# Patient Record
Sex: FEMALE | Race: WHITE | NOT HISPANIC OR LATINO | Employment: OTHER | URBAN - METROPOLITAN AREA
[De-identification: names, ages, dates, MRNs, and addresses within clinical notes are randomized per-mention and may not be internally consistent; named-entity substitution may affect disease eponyms.]

---

## 2017-09-21 ENCOUNTER — APPOINTMENT (EMERGENCY)
Dept: RADIOLOGY | Facility: HOSPITAL | Age: 82
DRG: 149 | End: 2017-09-21
Payer: COMMERCIAL

## 2017-09-21 ENCOUNTER — OFFICE VISIT (OUTPATIENT)
Dept: URGENT CARE | Age: 82
End: 2017-09-21
Payer: COMMERCIAL

## 2017-09-21 ENCOUNTER — HOSPITAL ENCOUNTER (INPATIENT)
Facility: HOSPITAL | Age: 82
LOS: 1 days | Discharge: HOME | DRG: 149 | End: 2017-09-24
Attending: EMERGENCY MEDICINE | Admitting: INTERNAL MEDICINE
Payer: COMMERCIAL

## 2017-09-21 DIAGNOSIS — I16.0 HYPERTENSIVE URGENCY: Primary | ICD-10-CM

## 2017-09-21 DIAGNOSIS — R42 VERTIGO: ICD-10-CM

## 2017-09-21 PROBLEM — E78.5 HYPERLIPIDEMIA: Status: ACTIVE | Noted: 2017-09-21

## 2017-09-21 PROBLEM — I10 HYPERTENSION: Status: ACTIVE | Noted: 2017-09-21

## 2017-09-21 PROBLEM — J34.89 SINUS PRESSURE: Status: ACTIVE | Noted: 2017-09-21

## 2017-09-21 PROBLEM — R73.03 PREDIABETES: Status: ACTIVE | Noted: 2017-09-21

## 2017-09-21 LAB
ANION GAP SERPL CALCULATED.3IONS-SCNC: 8 MMOL/L (ref 4–13)
ATRIAL RATE: 61 BPM
BASOPHILS # BLD AUTO: 0.03 THOUSANDS/ΜL (ref 0–0.1)
BASOPHILS NFR BLD AUTO: 0 % (ref 0–1)
BUN SERPL-MCNC: 27 MG/DL (ref 5–25)
CALCIUM SERPL-MCNC: 9.9 MG/DL (ref 8.3–10.1)
CHLORIDE SERPL-SCNC: 105 MMOL/L (ref 100–108)
CO2 SERPL-SCNC: 23 MMOL/L (ref 21–32)
CREAT SERPL-MCNC: 1.24 MG/DL (ref 0.6–1.3)
EOSINOPHIL # BLD AUTO: 0.14 THOUSAND/ΜL (ref 0–0.61)
EOSINOPHIL NFR BLD AUTO: 2 % (ref 0–6)
ERYTHROCYTE [DISTWIDTH] IN BLOOD BY AUTOMATED COUNT: 12.7 % (ref 11.6–15.1)
GFR SERPL CREATININE-BSD FRML MDRD: 38 ML/MIN/1.73SQ M
GLUCOSE SERPL-MCNC: 97 MG/DL (ref 65–140)
HCT VFR BLD AUTO: 41.5 % (ref 34.8–46.1)
HGB BLD-MCNC: 14.4 G/DL (ref 11.5–15.4)
LYMPHOCYTES # BLD AUTO: 2.43 THOUSANDS/ΜL (ref 0.6–4.47)
LYMPHOCYTES NFR BLD AUTO: 29 % (ref 14–44)
MCH RBC QN AUTO: 30.7 PG (ref 26.8–34.3)
MCHC RBC AUTO-ENTMCNC: 34.7 G/DL (ref 31.4–37.4)
MCV RBC AUTO: 89 FL (ref 82–98)
MONOCYTES # BLD AUTO: 0.52 THOUSAND/ΜL (ref 0.17–1.22)
MONOCYTES NFR BLD AUTO: 6 % (ref 4–12)
NEUTROPHILS # BLD AUTO: 5.29 THOUSANDS/ΜL (ref 1.85–7.62)
NEUTS SEG NFR BLD AUTO: 63 % (ref 43–75)
NRBC BLD AUTO-RTO: 0 /100 WBCS
P AXIS: 85 DEGREES
PLATELET # BLD AUTO: 267 THOUSANDS/UL (ref 149–390)
PMV BLD AUTO: 10.3 FL (ref 8.9–12.7)
POTASSIUM SERPL-SCNC: 4.3 MMOL/L (ref 3.5–5.3)
PR INTERVAL: 188 MS
QRS AXIS: 0 DEGREES
QRSD INTERVAL: 84 MS
QT INTERVAL: 410 MS
QTC INTERVAL: 412 MS
RBC # BLD AUTO: 4.69 MILLION/UL (ref 3.81–5.12)
SODIUM SERPL-SCNC: 136 MMOL/L (ref 136–145)
T WAVE AXIS: 90 DEGREES
VENTRICULAR RATE: 61 BPM
WBC # BLD AUTO: 8.43 THOUSAND/UL (ref 4.31–10.16)

## 2017-09-21 PROCEDURE — 99285 EMERGENCY DEPT VISIT HI MDM: CPT

## 2017-09-21 PROCEDURE — 93005 ELECTROCARDIOGRAM TRACING: CPT | Performed by: EMERGENCY MEDICINE

## 2017-09-21 PROCEDURE — 99213 OFFICE O/P EST LOW 20 MIN: CPT | Performed by: FAMILY MEDICINE

## 2017-09-21 PROCEDURE — 80048 BASIC METABOLIC PNL TOTAL CA: CPT | Performed by: EMERGENCY MEDICINE

## 2017-09-21 PROCEDURE — S9088 SERVICES PROVIDED IN URGENT: HCPCS | Performed by: FAMILY MEDICINE

## 2017-09-21 PROCEDURE — 70450 CT HEAD/BRAIN W/O DYE: CPT

## 2017-09-21 PROCEDURE — 36415 COLL VENOUS BLD VENIPUNCTURE: CPT | Performed by: EMERGENCY MEDICINE

## 2017-09-21 PROCEDURE — 85025 COMPLETE CBC W/AUTO DIFF WBC: CPT | Performed by: EMERGENCY MEDICINE

## 2017-09-21 RX ORDER — PANTOPRAZOLE SODIUM 20 MG/1
20 TABLET, DELAYED RELEASE ORAL
Status: DISCONTINUED | OUTPATIENT
Start: 2017-09-22 | End: 2017-09-24 | Stop reason: HOSPADM

## 2017-09-21 RX ORDER — LOSARTAN POTASSIUM 50 MG/1
25 TABLET ORAL DAILY
COMMUNITY
End: 2017-09-24 | Stop reason: HOSPADM

## 2017-09-21 RX ORDER — OMEPRAZOLE 20 MG/1
20 CAPSULE, DELAYED RELEASE ORAL DAILY
COMMUNITY
End: 2020-07-30 | Stop reason: ALTCHOICE

## 2017-09-21 RX ORDER — MECLIZINE HYDROCHLORIDE 25 MG/1
25 TABLET ORAL ONCE
Status: COMPLETED | OUTPATIENT
Start: 2017-09-21 | End: 2017-09-21

## 2017-09-21 RX ORDER — LOSARTAN POTASSIUM 50 MG/1
50 TABLET ORAL DAILY
Status: DISCONTINUED | OUTPATIENT
Start: 2017-09-22 | End: 2017-09-23

## 2017-09-21 RX ORDER — MECLIZINE HCL 12.5 MG/1
12.5 TABLET ORAL DAILY
Status: DISCONTINUED | OUTPATIENT
Start: 2017-09-22 | End: 2017-09-22

## 2017-09-21 RX ORDER — SODIUM CHLORIDE 9 MG/ML
75 INJECTION, SOLUTION INTRAVENOUS CONTINUOUS
Status: DISCONTINUED | OUTPATIENT
Start: 2017-09-21 | End: 2017-09-23

## 2017-09-21 RX ORDER — ASPIRIN 81 MG/1
81 TABLET, CHEWABLE ORAL DAILY
Status: DISCONTINUED | OUTPATIENT
Start: 2017-09-22 | End: 2017-09-24 | Stop reason: HOSPADM

## 2017-09-21 RX ORDER — CHLORAL HYDRATE 500 MG
1000 CAPSULE ORAL 2 TIMES DAILY
Status: DISCONTINUED | OUTPATIENT
Start: 2017-09-21 | End: 2017-09-24 | Stop reason: HOSPADM

## 2017-09-21 RX ORDER — CHLORAL HYDRATE 500 MG
1000 CAPSULE ORAL 2 TIMES DAILY
COMMUNITY
End: 2020-07-30 | Stop reason: ALTCHOICE

## 2017-09-21 RX ORDER — ATENOLOL 25 MG/1
25 TABLET ORAL DAILY
COMMUNITY
End: 2017-09-24 | Stop reason: HOSPADM

## 2017-09-21 RX ORDER — HEPARIN SODIUM 5000 [USP'U]/ML
5000 INJECTION, SOLUTION INTRAVENOUS; SUBCUTANEOUS EVERY 8 HOURS SCHEDULED
Status: DISCONTINUED | OUTPATIENT
Start: 2017-09-21 | End: 2017-09-24 | Stop reason: HOSPADM

## 2017-09-21 RX ORDER — HYDRALAZINE HYDROCHLORIDE 20 MG/ML
5 INJECTION INTRAMUSCULAR; INTRAVENOUS ONCE
Status: COMPLETED | OUTPATIENT
Start: 2017-09-21 | End: 2017-09-21

## 2017-09-21 RX ORDER — PHENOL 1.4 %
600 AEROSOL, SPRAY (ML) MUCOUS MEMBRANE DAILY
COMMUNITY

## 2017-09-21 RX ORDER — HYDRALAZINE HYDROCHLORIDE 20 MG/ML
5 INJECTION INTRAMUSCULAR; INTRAVENOUS EVERY 6 HOURS PRN
Status: DISCONTINUED | OUTPATIENT
Start: 2017-09-21 | End: 2017-09-22

## 2017-09-21 RX ORDER — CALCIUM CARBONATE 200(500)MG
1250 TABLET,CHEWABLE ORAL DAILY
Status: DISCONTINUED | OUTPATIENT
Start: 2017-09-22 | End: 2017-09-24 | Stop reason: HOSPADM

## 2017-09-21 RX ORDER — MELATONIN
1000 DAILY
COMMUNITY

## 2017-09-21 RX ORDER — ACETAMINOPHEN 325 MG/1
650 TABLET ORAL EVERY 6 HOURS PRN
Status: DISCONTINUED | OUTPATIENT
Start: 2017-09-21 | End: 2017-09-24 | Stop reason: HOSPADM

## 2017-09-21 RX ORDER — MELATONIN
1000 DAILY
Status: DISCONTINUED | OUTPATIENT
Start: 2017-09-22 | End: 2017-09-24 | Stop reason: HOSPADM

## 2017-09-21 RX ORDER — ATENOLOL 25 MG/1
25 TABLET ORAL DAILY
Status: DISCONTINUED | OUTPATIENT
Start: 2017-09-22 | End: 2017-09-22

## 2017-09-21 RX ORDER — ASPIRIN 81 MG/1
81 TABLET, CHEWABLE ORAL DAILY
COMMUNITY

## 2017-09-21 RX ORDER — LOSARTAN POTASSIUM 50 MG/1
25 TABLET ORAL DAILY
Status: DISCONTINUED | OUTPATIENT
Start: 2017-09-22 | End: 2017-09-21

## 2017-09-21 RX ADMIN — Medication 1000 MG: at 19:46

## 2017-09-21 RX ADMIN — HYDRALAZINE HYDROCHLORIDE 5 MG: 20 INJECTION INTRAMUSCULAR; INTRAVENOUS at 19:46

## 2017-09-21 RX ADMIN — SODIUM CHLORIDE 75 ML/HR: 0.9 INJECTION, SOLUTION INTRAVENOUS at 19:49

## 2017-09-21 RX ADMIN — HEPARIN SODIUM 5000 UNITS: 5000 INJECTION, SOLUTION INTRAVENOUS; SUBCUTANEOUS at 21:40

## 2017-09-21 RX ADMIN — MECLIZINE HYDROCHLORIDE 25 MG: 25 TABLET ORAL at 17:45

## 2017-09-21 NOTE — ED ATTENDING ATTESTATION
Marjorie Kayser, MD, saw and evaluated the patient  I have discussed the patient with the resident/non-physician practitioner and agree with the resident's/non-physician practitioner's findings, Plan of Care, and MDM as documented in the resident's/non-physician practitioner's note, except where noted  All available labs and Radiology studies were reviewed  At this point I agree with the current assessment done in the Emergency Department  I have conducted an independent evaluation of this patient a history and physical is as follows:      Critical Care Time  CritCare Time    81 yo female with hx of htn, hld, sent in from urgent care for high bp  Pt went to urgent care for vertigo, weakness, fatigue, lightheadedness  Pt with hx of vertigo intermittently for one year  Pt had meclizine but not taking it currently  Pt vertigo worse with position  No fever, no chills, no sob, no cp  No headache  Vss, hypertensive, afebrile, lungs cta, rrr, abdomen soft nontender, no neuro deficits  Ekg, labs, cta head and neck   Hypertensive urgency, labs

## 2017-09-21 NOTE — ED PROVIDER NOTES
History  Chief Complaint   Patient presents with    Hypertension     Pt was at urgent care because of "not feeling right", jaw throat pain  Pt sent here for hypertension  44-year-old with history of hypertension hyperlipidemia presents for evaluation of vertigo and elevated blood pressure  Patient reports that she was not feeling well today and went to urgent care and was advised to come to the ER given her blood pressure being elevated in the 570O systolic  Reports that she has had intermittent vertigo off and on for the past year  Reports having a prior normal CT of her head  Has previously taken meclizine but stopped taking it because of the side effects  Denies having any nausea, vomiting, headache, chest pain or shortness of breath  No recent illness  Denies any recent changes to her medications  She has not missed any doses of her antihypertensives  Prior to Admission Medications   Prescriptions Last Dose Informant Patient Reported? Taking? Multiple Vitamins-Minerals (MULTIVITAMIN ADULT PO)   Yes Yes   Sig: Take 1 tablet by mouth daily   Omega-3 Fatty Acids (FISH OIL) 1,000 mg   Yes Yes   Sig: Take 1,000 mg by mouth 2 (two) times a day   aspirin 81 mg chewable tablet   Yes Yes   Sig: Chew 81 mg daily   atenolol (TENORMIN) 25 mg tablet   Yes Yes   Sig: Take 25 mg by mouth daily   calcium carbonate (OS-ESTEBAN) 600 MG tablet   Yes Yes   Sig: Take 600 mg by mouth daily   cholecalciferol (VITAMIN D3) 1,000 units tablet   Yes Yes   Sig: Take 1,000 Units by mouth daily   losartan (COZAAR) 50 mg tablet   Yes Yes   Sig: Take 25 mg by mouth daily Patient takes 1 and 1/2 pills daily     omeprazole (PriLOSEC) 20 mg delayed release capsule   Yes Yes   Sig: Take 20 mg by mouth daily      Facility-Administered Medications: None       Past Medical History:   Diagnosis Date    Hyperlipidemia     Hypertension        Past Surgical History:   Procedure Laterality Date    HYSTERECTOMY         History reviewed  No pertinent family history  I have reviewed and agree with the history as documented  Social History   Substance Use Topics    Smoking status: Former Smoker    Smokeless tobacco: Never Used    Alcohol use No        Review of Systems   Constitutional: Negative for chills and fever  HENT: Negative for congestion and sore throat  Eyes: Negative for pain and redness  Respiratory: Negative for shortness of breath and wheezing  Cardiovascular: Negative for chest pain and palpitations  Gastrointestinal: Negative for abdominal pain, diarrhea and vomiting  Endocrine: Negative for polydipsia and polyphagia  Genitourinary: Negative for dysuria and flank pain  Musculoskeletal: Negative for arthralgias and back pain  Skin: Negative for rash and wound  Neurological: Positive for dizziness  Negative for seizures and headaches  Psychiatric/Behavioral: Negative for agitation and behavioral problems  All other systems reviewed and are negative  Physical Exam  ED Triage Vitals [09/21/17 1453]   Temperature Pulse Respirations Blood Pressure SpO2   (!) 97 4 °F (36 3 °C) 65 16 (!) 231/122 97 %      Temp Source Heart Rate Source Patient Position - Orthostatic VS BP Location FiO2 (%)   Oral Monitor Sitting Left arm --      Pain Score       No Pain           Physical Exam   Constitutional: She is oriented to person, place, and time  She appears well-developed and well-nourished  HENT:   Head: Normocephalic and atraumatic  Right Ear: External ear normal    Left Ear: External ear normal    Mouth/Throat: Oropharynx is clear and moist    Eyes: EOM are normal  Pupils are equal, round, and reactive to light  Neck: Normal range of motion  Cardiovascular: Normal rate, regular rhythm and normal heart sounds  Exam reveals no friction rub  No murmur heard  Pulmonary/Chest: Effort normal  No respiratory distress  She has no wheezes  Abdominal: Soft   Bowel sounds are normal  She exhibits no distension  There is no tenderness  There is no rebound and no guarding  Musculoskeletal: Normal range of motion  She exhibits no edema  Neurological: She is alert and oriented to person, place, and time  No cranial nerve deficit  Coordination normal    Skin: Skin is warm  No erythema  Psychiatric: She has a normal mood and affect  Her behavior is normal    Nursing note and vitals reviewed  ED Medications  Medications   meclizine (ANTIVERT) tablet 25 mg (25 mg Oral Given 9/21/17 4005)       Diagnostic Studies  Labs Reviewed   BASIC METABOLIC PANEL - Abnormal        Result Value Ref Range Status    BUN 27 (*) 5 - 25 mg/dL Final    Sodium 136  136 - 145 mmol/L Final    Potassium 4 3  3 5 - 5 3 mmol/L Final    Chloride 105  100 - 108 mmol/L Final    CO2 23  21 - 32 mmol/L Final    Anion Gap 8  4 - 13 mmol/L Final    Creatinine 1 24  0 60 - 1 30 mg/dL Final    Comment: Standardized to IDMS reference method    Glucose 97  65 - 140 mg/dL Final    Comment:   If the patient is fasting, the ADA then defines impaired fasting glucose as > 100 mg/dL and diabetes as > or equal to 123 mg/dL  Specimen collection should occur prior to Sulfasalazine administration due to the potential for falsely depressed results  Specimen collection should occur prior to Sulfapyridine administration due to the potential for falsely elevated results  Calcium 9 9  8 3 - 10 1 mg/dL Final    eGFR 38  ml/min/1 73sq m Final    Narrative:     National Kidney Disease Education Program recommendations are as follows:  GFR calculation is accurate only with a steady state creatinine  Chronic Kidney disease less than 60 ml/min/1 73 sq  meters  Kidney failure less than 15 ml/min/1 73 sq  meters     CBC AND DIFFERENTIAL - Normal    WBC 8 43  4 31 - 10 16 Thousand/uL Final    RBC 4 69  3 81 - 5 12 Million/uL Final    Hemoglobin 14 4  11 5 - 15 4 g/dL Final    Hematocrit 41 5  34 8 - 46 1 % Final    MCV 89  82 - 98 fL Final    MCH 30 7  26 8 - 34 3 pg Final    MCHC 34 7  31 4 - 37 4 g/dL Final    RDW 12 7  11 6 - 15 1 % Final    MPV 10 3  8 9 - 12 7 fL Final    Platelets 196  540 - 390 Thousands/uL Final    nRBC 0  /100 WBCs Final    Neutrophils Relative 63  43 - 75 % Final    Lymphocytes Relative 29  14 - 44 % Final    Monocytes Relative 6  4 - 12 % Final    Eosinophils Relative 2  0 - 6 % Final    Basophils Relative 0  0 - 1 % Final    Neutrophils Absolute 5 29  1 85 - 7 62 Thousands/µL Final    Lymphocytes Absolute 2 43  0 60 - 4 47 Thousands/µL Final    Monocytes Absolute 0 52  0 17 - 1 22 Thousand/µL Final    Eosinophils Absolute 0 14  0 00 - 0 61 Thousand/µL Final    Basophils Absolute 0 03  0 00 - 0 10 Thousands/µL Final       CT head wo contrast   Final Result      1  No acute intracranial pathology  Workstation performed: [id number]             Procedures  Procedures      Phone Consults  ED Phone Contact    ED Course  ED Course                              MDM  Number of Diagnoses or Management Options  Hypertensive urgency:   Vertigo:   Diagnosis management comments: Impression:  Vertigo, hypertension and well-appearing patient with normal neurologic exam   Likely peripheral but concern for central etiology given BP and acute presentation  Plan:  CT head/neck, labs, treat symptoms    Unable to obtain CTA given patient's GFR  Will have to admit for MRI to rule out possible posterior stroke  CritCare Time    Disposition  Final diagnoses:   Hypertensive urgency   Vertigo     ED Disposition     ED Disposition Condition Comment    Admit  Case was discussed with SOD and the patient's admission status was agreed to be Admission Status: observation status to the service of Dr Gulshan Devine   Follow-up Information    None       Patient's Medications   Discharge Prescriptions    No medications on file     No discharge procedures on file  ED Provider  Attending physically available and evaluated [facility]   I managed the patient along with the ED Attending      Electronically Signed by       Apple Watson MD  Resident  09/21/17 6276

## 2017-09-22 LAB
ANION GAP SERPL CALCULATED.3IONS-SCNC: 7 MMOL/L (ref 4–13)
BUN SERPL-MCNC: 24 MG/DL (ref 5–25)
CALCIUM SERPL-MCNC: 8.8 MG/DL (ref 8.3–10.1)
CHLORIDE SERPL-SCNC: 109 MMOL/L (ref 100–108)
CO2 SERPL-SCNC: 24 MMOL/L (ref 21–32)
CREAT SERPL-MCNC: 1.05 MG/DL (ref 0.6–1.3)
ERYTHROCYTE [DISTWIDTH] IN BLOOD BY AUTOMATED COUNT: 12.6 % (ref 11.6–15.1)
EST. AVERAGE GLUCOSE BLD GHB EST-MCNC: 148 MG/DL
GFR SERPL CREATININE-BSD FRML MDRD: 47 ML/MIN/1.73SQ M
GLUCOSE SERPL-MCNC: 108 MG/DL (ref 65–140)
HBA1C MFR BLD: 6.8 % (ref 4.2–6.3)
HCT VFR BLD AUTO: 37.6 % (ref 34.8–46.1)
HGB BLD-MCNC: 12.6 G/DL (ref 11.5–15.4)
MCH RBC QN AUTO: 30.1 PG (ref 26.8–34.3)
MCHC RBC AUTO-ENTMCNC: 33.5 G/DL (ref 31.4–37.4)
MCV RBC AUTO: 90 FL (ref 82–98)
PLATELET # BLD AUTO: 257 THOUSANDS/UL (ref 149–390)
PMV BLD AUTO: 10.8 FL (ref 8.9–12.7)
POTASSIUM SERPL-SCNC: 3.9 MMOL/L (ref 3.5–5.3)
RBC # BLD AUTO: 4.19 MILLION/UL (ref 3.81–5.12)
SODIUM SERPL-SCNC: 140 MMOL/L (ref 136–145)
WBC # BLD AUTO: 7.48 THOUSAND/UL (ref 4.31–10.16)

## 2017-09-22 PROCEDURE — 83036 HEMOGLOBIN GLYCOSYLATED A1C: CPT | Performed by: INTERNAL MEDICINE

## 2017-09-22 PROCEDURE — 97163 PT EVAL HIGH COMPLEX 45 MIN: CPT

## 2017-09-22 PROCEDURE — 80048 BASIC METABOLIC PNL TOTAL CA: CPT | Performed by: INTERNAL MEDICINE

## 2017-09-22 PROCEDURE — 97166 OT EVAL MOD COMPLEX 45 MIN: CPT

## 2017-09-22 PROCEDURE — G8987 SELF CARE CURRENT STATUS: HCPCS

## 2017-09-22 PROCEDURE — 85027 COMPLETE CBC AUTOMATED: CPT | Performed by: INTERNAL MEDICINE

## 2017-09-22 PROCEDURE — G8979 MOBILITY GOAL STATUS: HCPCS

## 2017-09-22 PROCEDURE — G8978 MOBILITY CURRENT STATUS: HCPCS

## 2017-09-22 PROCEDURE — G8988 SELF CARE GOAL STATUS: HCPCS

## 2017-09-22 RX ORDER — CARVEDILOL 6.25 MG/1
6.25 TABLET ORAL 2 TIMES DAILY WITH MEALS
Status: DISCONTINUED | OUTPATIENT
Start: 2017-09-22 | End: 2017-09-23

## 2017-09-22 RX ORDER — LABETALOL HYDROCHLORIDE 5 MG/ML
10 INJECTION, SOLUTION INTRAVENOUS EVERY 6 HOURS PRN
Status: DISCONTINUED | OUTPATIENT
Start: 2017-09-22 | End: 2017-09-24 | Stop reason: HOSPADM

## 2017-09-22 RX ADMIN — SODIUM CHLORIDE 75 ML/HR: 0.9 INJECTION, SOLUTION INTRAVENOUS at 22:19

## 2017-09-22 RX ADMIN — CARVEDILOL 6.25 MG: 6.25 TABLET, FILM COATED ORAL at 20:29

## 2017-09-22 RX ADMIN — HEPARIN SODIUM 5000 UNITS: 5000 INJECTION, SOLUTION INTRAVENOUS; SUBCUTANEOUS at 15:37

## 2017-09-22 RX ADMIN — VITAMIN D, TAB 1000IU (100/BT) 1000 UNITS: 25 TAB at 08:15

## 2017-09-22 RX ADMIN — ATENOLOL 25 MG: 25 TABLET ORAL at 08:14

## 2017-09-22 RX ADMIN — PANTOPRAZOLE SODIUM 20 MG: 20 TABLET, DELAYED RELEASE ORAL at 05:11

## 2017-09-22 RX ADMIN — Medication 1000 MG: at 08:16

## 2017-09-22 RX ADMIN — SODIUM CHLORIDE 75 ML/HR: 0.9 INJECTION, SOLUTION INTRAVENOUS at 08:10

## 2017-09-22 RX ADMIN — HEPARIN SODIUM 5000 UNITS: 5000 INJECTION, SOLUTION INTRAVENOUS; SUBCUTANEOUS at 05:11

## 2017-09-22 RX ADMIN — ASPIRIN 81 MG 81 MG: 81 TABLET ORAL at 08:15

## 2017-09-22 RX ADMIN — LOSARTAN POTASSIUM 50 MG: 50 TABLET, FILM COATED ORAL at 08:16

## 2017-09-22 RX ADMIN — MECLIZINE HCL 12.5 MG: 12.5 TABLET ORAL at 08:16

## 2017-09-22 RX ADMIN — LABETALOL 20 MG/4 ML (5 MG/ML) INTRAVENOUS SYRINGE 10 MG: at 22:32

## 2017-09-22 RX ADMIN — LABETALOL 20 MG/4 ML (5 MG/ML) INTRAVENOUS SYRINGE 10 MG: at 09:55

## 2017-09-22 RX ADMIN — HEPARIN SODIUM 5000 UNITS: 5000 INJECTION, SOLUTION INTRAVENOUS; SUBCUTANEOUS at 22:19

## 2017-09-22 RX ADMIN — Medication 1250 MG: at 08:15

## 2017-09-22 RX ADMIN — Medication 1000 MG: at 17:32

## 2017-09-22 RX ADMIN — Medication 1 TABLET: at 08:16

## 2017-09-22 NOTE — PLAN OF CARE
Problem: OCCUPATIONAL THERAPY ADULT  Goal: Performs self-care activities at highest level of function for planned discharge setting  See evaluation for individualized goals  Treatment Interventions: ADL retraining, Functional transfer training, Endurance training, Patient/family training, Compensatory technique education, Continued evaluation, Energy conservation, Activityengagement          See flowsheet documentation for full assessment, interventions and recommendations  Limitation: Decreased endurance, Decreased high-level ADLs, Decreased self-care trans  Prognosis: Good  Assessment: Pt is a 79 y/o female seen for OT eval s/p adm to [facility] w/ vertigo and hypertensive urgency  Comorbidities include prediabetes, sinus pressure and a h/o HTN, HLD  Pt with active OT orders and up as tolerated orders  Pt lives alone in an independent senior living community  Pt was I w/ ADLS and IADLS & required no use of DME PTA  Pt is currently demonstrating the following occupational deficits: CGA- S overall for ADLS, transfers and short mobility (2 feet) steadying/balance and safety 2* dizziness and increased BP  OT evaluation limited 2* high BP  Supine in bed pt /80  Seated EOB /90  And post short ambulation to bedside chair (2 feet) pt /93  Pt is limited 2* dizziness, hypertension, decreased standing tolerance, decreased ADL status and decreased mobility  The following Occupational Performance Areas to address include: bathing/shower, toilet hygiene, dressing, functional mobility, community mobility, clothing management, meal prep and household maintenance  Pt scored overall 70/100 on the Barthel Index  Based on the aforementioned OT evaluation, functional performance deficits, and assessments, pt has been identified as a moderate complexity evaluation  Anticipate pt may be able to return home pending improvement of medical symptoms with outpatient vestibular therapy   Pt to continue to benefit from acute immediate OT services to address the following goals 3-5x/wk to  w/in 7-10 days:     Discharge Recommendation:  (outpatient vestibular therapy)  OT - OK to Discharge: No     Christina Simms MS, OTR/L

## 2017-09-22 NOTE — SOCIAL WORK
CM met with patient,explained CM role with introduction  Patient lives alone at [facility] , patient independent PTA, including driving  Patient has no prior DME, HHC or inpatient rehab experience,denies MH or drug and alcohol treatment  Patient has a prescription plan and uses [address], which delivers the meds  PCP is Dr Klever Delacruz  Primary  is patient's daughter and Marvin Guzman [phone], cell: [phone]  Patient moved her form [place] approx 1 year ago, granddaughter lives in [facility], daughter lives in [place]  CM reviewed d/c planning process including the following: identifying help at home, patient preference for d/c planning needs, Discharge Lounge, [facility] Meds to Bed program, availability of treatment team to discuss questions or concerns patient and/or family may have regarding understanding medications and recognizing signs and symptoms once discharged  CM also encouraged patient to follow up with all recommended appointments after discharge  Patient advised of importance for patient and family to participate in managing patients medical well being  Daughter can transport home on discharge  CM will follow for discharge needs

## 2017-09-22 NOTE — PROGRESS NOTES
IM Residency Progress Note   Unit/Bed#: CW2 -01 Encounter: [id number]  SOD Team B       Shin Alicia 80 y o  female [phone]    Hospital Stay Days: 0      Assessment/Plan:    Principal Problem:    Vertigo  Active Problems:    Hypertension    Hyperlipidemia    Hypertensive urgency    Prediabetes    Sinus pressure    Vertigo  Differential inclues BPPV vs 2/2 hypertensiv urgncy sv posterior acute CVA  CT head negative for any acute intracranial process  Low suspicious for acute CVA so stroke pathway not started and neuro was not consulted  Restart meclizien at low dose of 12 5mg daily  Up and out of bed with assistance  PT/OT consult  Consider outpatient PT if BPPV  Epley maneuver    Hypertensive Urgency  Blood pressure 231/122 on presentation to the ED  Consider relation to the vertigo above  Unclear etiology - patient is compliant with her antihypertensievs  Patient received 1 time dose of 5mg hydralazine   Restart BP meds todasy including atenolol 25mg and losartan 50mg  Notably, blood pressures have been well controlled after only 1 dose of hydralazine at 7:45 yesterday  140-160/60-70    Hyperlipidemia  Continue fish oil supplement  Continue aspirn  Patient is unsure if she is on a statin - will have to contact pharmacy regarding it  Prediabetes  F/U a1c  Continue correctional insulin for now    GERD  Continue TUMS and protonix      Disposition: possible dc to rehab within 24 hours       Subjective:   Patient seen and examined  She continues to note minimal episodes of vertigo, associated with sudden turns of her head  No other complaints offered          Vitals: Temp (24hrs), Av 6 °F (36 4 °C), Min:97 1 °F (36 2 °C), Max:98 °F (36 7 °C)  Current: Temperature: 97 9 °F (36 6 °C)  Vitals:    17 1919 17 2207 17 2340 17 0308   BP: (!) 180/90 148/68 156/68 139/61   Pulse: 64  60 65   Resp: 18  18 18   Temp: (!) 97 1 °F (36 2 °C)  98 °F (36 7 °C) 97 9 °F (36 6 °C)   TempSrc: Oral Oral Oral   SpO2: 96%  97% 96%   Weight: 71 4 kg (157 lb 6 4 oz)      Height: 5' 2" (1 575 m)       Body mass index is 28 79 kg/m²  I/O last 24 hours: In: 950 [I V :950]  Out: 450 [Urine:450]      Physical Exam: General appearance: alert, appears stated age and cooperative  Head: Normocephalic, without obvious abnormality, atraumatic  Eyes: conjunctivae/corneas clear  PERRL, EOM's intact  Fundi benign    Neck: no JVD and supple, symmetrical, trachea midline  Lungs: clear to auscultation bilaterally  Heart: regular rate and rhythm, S1, S2 normal, no murmur, click, rub or gallop  Abdomen: soft, non-tender; bowel sounds normal; no masses,  no organomegaly  Extremities: extremities normal, atraumatic, no cyanosis or edema  Skin: Skin color, texture, turgor normal  No rashes or lesions  Neurologic: Grossly normal     Invasive Devices     Peripheral Intravenous Line            Peripheral IV 09/21/17 Left Antecubital less than 1 day                          Labs:   Recent Results (from the past 24 hour(s))   ECG 12 lead    Collection Time: 09/21/17  3:03 PM   Result Value Ref Range    Ventricular Rate 61 BPM    Atrial Rate 61 BPM    NH Interval 188 ms    QRSD Interval 84 ms    QT Interval 410 ms    QTC Interval 412 ms    P Axis 85 degrees    QRS Axis 0 degrees    T Wave Axis 90 degrees   Basic metabolic panel    Collection Time: 09/21/17  3:59 PM   Result Value Ref Range    Sodium 136 136 - 145 mmol/L    Potassium 4 3 3 5 - 5 3 mmol/L    Chloride 105 100 - 108 mmol/L    CO2 23 21 - 32 mmol/L    Anion Gap 8 4 - 13 mmol/L    BUN 27 (H) 5 - 25 mg/dL    Creatinine 1 24 0 60 - 1 30 mg/dL    Glucose 97 65 - 140 mg/dL    Calcium 9 9 8 3 - 10 1 mg/dL    eGFR 38 ml/min/1 73sq m   CBC and differential    Collection Time: 09/21/17  3:59 PM   Result Value Ref Range    WBC 8 43 4 31 - 10 16 Thousand/uL    RBC 4 69 3 81 - 5 12 Million/uL    Hemoglobin 14 4 11 5 - 15 4 g/dL    Hematocrit 41 5 34 8 - 46 1 %    MCV 89 82 - 98 fL MCH 30 7 26 8 - 34 3 pg    MCHC 34 7 31 4 - 37 4 g/dL    RDW 12 7 11 6 - 15 1 %    MPV 10 3 8 9 - 12 7 fL    Platelets 047 246 - 525 Thousands/uL    nRBC 0 /100 WBCs    Neutrophils Relative 63 43 - 75 %    Lymphocytes Relative 29 14 - 44 %    Monocytes Relative 6 4 - 12 %    Eosinophils Relative 2 0 - 6 %    Basophils Relative 0 0 - 1 %    Neutrophils Absolute 5 29 1 85 - 7 62 Thousands/µL    Lymphocytes Absolute 2 43 0 60 - 4 47 Thousands/µL    Monocytes Absolute 0 52 0 17 - 1 22 Thousand/µL    Eosinophils Absolute 0 14 0 00 - 0 61 Thousand/µL    Basophils Absolute 0 03 0 00 - 0 10 Thousands/µL   Basic metabolic panel    Collection Time: 09/22/17  4:36 AM   Result Value Ref Range    Sodium 140 136 - 145 mmol/L    Potassium 3 9 3 5 - 5 3 mmol/L    Chloride 109 (H) 100 - 108 mmol/L    CO2 24 21 - 32 mmol/L    Anion Gap 7 4 - 13 mmol/L    BUN 24 5 - 25 mg/dL    Creatinine 1 05 0 60 - 1 30 mg/dL    Glucose 108 65 - 140 mg/dL    Calcium 8 8 8 3 - 10 1 mg/dL    eGFR 47 ml/min/1 73sq m   CBC (With Platelets)    Collection Time: 09/22/17  4:36 AM   Result Value Ref Range    WBC 7 48 4 31 - 10 16 Thousand/uL    RBC 4 19 3 81 - 5 12 Million/uL    Hemoglobin 12 6 11 5 - 15 4 g/dL    Hematocrit 37 6 34 8 - 46 1 %    MCV 90 82 - 98 fL    MCH 30 1 26 8 - 34 3 pg    MCHC 33 5 31 4 - 37 4 g/dL    RDW 12 6 11 6 - 15 1 %    Platelets 027 351 - 236 Thousands/uL    MPV 10 8 8 9 - 12 7 fL   Hemoglobin A1c    Collection Time: 09/22/17  4:36 AM   Result Value Ref Range    Hemoglobin A1C 6 8 (H) 4 2 - 6 3 %     mg/dl       Radiology Results: I have personally reviewed pertinent reports  Other Diagnostic Testing:   I have personally reviewed pertinent reports          Active Meds:   Current Facility-Administered Medications   Medication Dose Route Frequency    acetaminophen (TYLENOL) tablet 650 mg  650 mg Oral Q6H PRN    aspirin chewable tablet 81 mg  81 mg Oral Daily    atenolol (TENORMIN) tablet 25 mg  25 mg Oral Daily  calcium carbonate (TUMS) chewable tablet 1,250 mg  1,250 mg Oral Daily    cholecalciferol (VITAMIN D3) tablet 1,000 Units  1,000 Units Oral Daily    fish oil capsule 1,000 mg  1,000 mg Oral BID    heparin (porcine) subcutaneous injection 5,000 Units  5,000 Units Subcutaneous Q8H [address]    hydrALAZINE (APRESOLINE) injection 5 mg  5 mg Intravenous Q6H PRN    losartan (COZAAR) tablet 50 mg  50 mg Oral Daily    meclizine (ANTIVERT) tablet 12 5 mg  12 5 mg Oral Daily    multivitamin-minerals (CENTRUM) tablet 1 tablet  1 tablet Oral Daily    pantoprazole (PROTONIX) EC tablet 20 mg  20 mg Oral Early Morning    sodium chloride 0 9 % infusion  75 mL/hr Intravenous Continuous         VTE Pharmacologic Prophylaxis: Heparin  VTE Mechanical Prophylaxis: sequential compression device    Michael Mckinney MD

## 2017-09-22 NOTE — PLAN OF CARE
Problem: PHYSICAL THERAPY ADULT  Goal: Performs mobility at highest level of function for planned discharge setting  See evaluation for individualized goals  Treatment/Interventions: LE strengthening/ROM, Endurance training, Bed mobility, Gait training, Equipment eval/education, Patient/family training          See flowsheet documentation for full assessment, interventions and recommendations  Prognosis: Good  Problem List: Decreased endurance, Decreased mobility, Decreased safety awareness  Assessment: Pt is 80 y o  female seen for PT evaluation s/p admit to [facility] on 9/21/2017 w/ Vertigo  PT consulted to assess pt's functional mobility and d/c needs  Order placed for PT eval and tx, w/ up as tolerated order  Comorbidities affecting pt's physical performance at time of assessment include: HTN, HLD, prediabetes   PTA, pt was ambulates unrestricted distances and all terrain  Personal factors affecting pt at time of IE include: unable to perform dynamic tasks in community and unable to perform physical activity  Please find objective findings from PT assessment regarding body systems outlined above with impairments and limitations including gait deviations, decreased activity tolerance, decreased functional mobility tolerance and decreased safety awareness  Pt reports no dizziness throughout evaluation or headache  Limited assessment with high BP which slowly increased with minimal activity  The following objective measures performed on IE also reveal limitations: Barthel Index: 70/100  Pt's clinical presentation is currently unstable/unpredictable seen in pt's presentation of unstable BP  Pt to benefit from continued PT tx to address deficits as defined above and maximize level of functional independent mobility and consistency  From PT/mobility standpoint, recommendation at time of d/c would be OP PT pending progress in order to facilitate return to PLOF          Recommendation: Outpatient PT (vestibular therapy)     PT - OK to Discharge:  (anticipate pt will achieve goals when BP stable)    See flowsheet documentation for full assessment

## 2017-09-22 NOTE — OCCUPATIONAL THERAPY NOTE
[address]  Evaluation     Patient Name: Nahomy Jones  [id number] Date: 9/22/2017  Problem List  Patient Active Problem List   Diagnosis    Hypertension    Hyperlipidemia    Vertigo    Hypertensive urgency    Prediabetes    Sinus pressure     Past Medical History  Past Medical History:   Diagnosis Date    Hyperlipidemia     Hypertension      Past Surgical History  Past Surgical History:   Procedure Laterality Date    HYSTERECTOMY           09/22/17 0859   Note Type   Note type Eval/Treat   Restrictions/Precautions   Weight Bearing Precautions Per Order No   Other Precautions Multiple lines  (high BP)   Pain Assessment   Pain Assessment No/denies pain   Pain Score No Pain   Home Living   Type of Home Apartment  (I living at [facility] )   Home Layout One level   Bathroom Shower/Tub Walk-in shower   Bathroom Toilet Raised   Bathroom Equipment Shower chair;Grab bars around toilet   [facility]   Additional Comments Pt lives alone in an independent senior living community  Prior Function   Level of [place] Independent with ADLs and functional mobility   Lives With Alone   ADL Assistance Independent   IADLs Independent   Comments Pt was I w/ ADLS and IADLS & required no use of DME PTA      Lifestyle   Autonomy I with ADLS and mobility PTA    Reciprocal Relationships Pt lives alone in a senior I living apartment complex    Intrinsic Gratification Pt very active PTA involved in many exercise classes and social    Psychosocial   Psychosocial (WDL) WDL   ADL   Eating Assistance 7  Independent   Grooming Assistance 7  Independent   UB [address] 5  Supervision/Setup   LB Bathing Assistance 4  Minimal Assistance  (CGA)   UB Dressing Assistance 5  Supervision/Setup   LB Dressing Assistance 4  Minimal Assistance  (CGA)   Toileting Assistance  5  [id number] [facility]   Additional Comments Overall pt is performing ADLS with CGA-S for steadying/balance 2* dizziness and increased BP   Bed Mobility   Supine to Sit 5  Supervision   Sit to Supine 5  Supervision   Transfers   Sit to Stand 5  Supervision   Additional items Assist x 1; Increased time required   Stand to Sit 5  Supervision   Additional items Assist x 1; Increased time required   Stand pivot 4  Minimal assistance   Additional items Assist x 1; Increased time required   Additional Comments Pt performs functional transfers with close S- CGA for steadying/balance 2* high BP  Functional Mobility   Functional Mobility 4  Minimal assistance   Additional Comments Pt performed functional mobility from bed to bedside chair (2 feet) with CGA for steadying/balance  Unable to perform greater mobility 2* increased BP   Additional items Hand hold assistance   Balance   Static Sitting Good   Dynamic Sitting Fair +   Static Standing Fair   Dynamic Standing Fair -   Activity Tolerance   Activity Tolerance Patient tolerated treatment well;Treatment limited secondary to medical complications (Comment)   RUE Assessment   RUE Assessment WFL   LUE Assessment   LUE Assessment WFL   Hand Function   Gross Motor Coordination Functional   Fine Motor Coordination Functional   Cognition   Overall Cognitive Status WFL   Arousal/Participation Alert; Responsive; Cooperative   Attention Within functional limits   Orientation Level Oriented X4   Memory Within functional limits   Following Commands Follows all commands and directions without difficulty   Assessment   Limitation Decreased endurance;Decreased high-level ADLs; Decreased self-care trans   Prognosis Good   Assessment Pt is a 81 y/o female seen for OT eval s/p adm to [facility] w/ vertigo and hypertensive urgency  Comorbidities include prediabetes, sinus pressure and a h/o HTN, HLD  Pt with active OT orders and up as tolerated orders  Pt lives alone in an independent senior living community  Pt was I w/ ADLS and IADLS & required no use of DME PTA   Pt is currently demonstrating the following occupational deficits: CGA- S overall for ADLS, transfers and short mobility (2 feet) steadying/balance and safety 2* dizziness and increased BP  OT evaluation limited 2* high BP  Supine in bed pt /80  Seated EOB /90  And post short ambulation to bedside chair (2 feet) pt /93  Pt is limited 2* dizziness, hypertension, decreased standing tolerance, decreased ADL status and decreased mobility  The following Occupational Performance Areas to address include: bathing/shower, toilet hygiene, dressing, functional mobility, community mobility, clothing management, meal prep and household maintenance  Pt scored overall 70/100 on the Barthel Index  Based on the aforementioned OT evaluation, functional performance deficits, and assessments, pt has been identified as a moderate complexity evaluation  Anticipate pt may be able to return home pending improvement of medical symptoms with outpatient vestibular therapy  Pt to continue to benefit from acute immediate OT services to address the following goals 3-5x/wk to  w/in 7-10 days:   Goals   Patient Goals to get better    Plan   Treatment Interventions ADL retraining;Functional transfer training; Endurance training;Patient/family training; Compensatory technique education;Continued evaluation; Energy conservation; Activityengagement   Goal Expiration Date 10/02/17   OT Frequency 3-5x/wk   Recommendation   Discharge Recommendation (outpatient vestibular therapy)   OT - OK to Discharge No   Barthel Index   Feeding 10   Bathing 5   Grooming Score 5   Dressing Score 10   Bladder Score 10   Bowels Score 10   Toilet Use Score 10   Transfers (Bed/Chair) Score 10   Mobility (Level Surface) Score 0   Stairs Score 0   Barthel Index Score 70       GOALS:  1) Pt will improve activity tolerance to G for min 30 min txment sessions  2) Pt will complete ADLs/self care w/ mod I w/ G hyiene/thoroughness w/ min cues fro cog support  3) Pt will complete toileting w/ mod I w/ G hygiene/thoroughness using DME as needed  4) Pt will improve functional transfers on/off all surfaces using DME as needed w/ G balance/safety including toileting w/ mod I  5) Pt will improve functional mobility during ADL/IADL/leisure tasks using DME as needed w/ g balance/safety w/ mod I  6) Pt will demonstrate G carryover of pt/caregiver education and training as appropriate w/ mod I w/o cues w/ G tolerance  7) Pt will demonstrate 100% carryover of learned E C  techniques s/p skilled education w/o cues t/o functional ADL/ IADL/leisure interest tasks w/ mod I      Ronny Isbell MS, OTR/L

## 2017-09-22 NOTE — CASE MANAGEMENT
Initial Clinical Review    Admission: Date/Time/Statement: N/A @ N/A     Orders Placed This Encounter   Procedures    Place in Observation (expected length of stay for this patient is less than two midnights)     Standing Status:   Standing     Number of Occurrences:   1     Order Specific Question:   Admitting Physician     Answer:   Ajit Daly [[id number]]     Order Specific Question:   Level of Care     Answer:   Med Surg [16]         ED: Date/Time/Mode of Arrival:   ED Arrival Information     Expected Arrival Acuity Means of Arrival Escorted By Service Admission Type    9/21/2017 14:14 9/21/2017 14:41 Emergent Walk-In Self General Medicine Emergency    Arrival Complaint    Hypertension Urgency          Chief Complaint:   Chief Complaint   Patient presents with    Hypertension     Pt was at urgent care because of "not feeling right", jaw throat pain  Pt sent here for hypertension  History of Illness: 80 y o  female with a past medical history of hypertension, hyperlipidemia, and prediabetes who presented to the emergency department with a chief complaint of vertigo  Patient states the vertigo began about 3 weeks ago  She does have a history of prior vertigo approximately 1 year ago and was put on meclizine by her PCP, however patient decided to stop taking this medication after a few doses as it made her too drowsy  She denies any further episodes of vertigo up until about 3 weeks ago  She states that it usually occurs when she turns her head a certain way, particularly at night when she is laying on her right side  She describes the sensation as the room spinning"  Nothing seems to alleviate her symptoms though sometimes closing her eyes helps and they are exacerbated with change position  She states that it usually resolves spontaneously  Currently she is without vertigo in the ED  Associated symptoms include nausea but no vomiting  No bladder or bowel incontinence  No fevers or chills  Denies any focal neurologic deficits, ataxia, or visual disturbances  No headaches  Of note, patient initially presented to a nearby urgent care facility earlier today as her daughter stated she did not look right as she appeared fatigued  The patient has had some sinus pressure, watery eyes and some bilateral ear pain with radiation to her jaw for about the past week, though otoscopic exam only shows cerumen without evidence of an otitis  At the urgent care office, patient was noted to be hypertensive with systolic greater than 459  On presentation to the ED, patient was noted to have a blood pressure of 220/120  Patient does not recall having history of severely elevated blood pressures, however she does not check her blood pressure routinely at home  She reports 100% compliance with her anti- hypertensive regimen  CT head without contrast was negative for any acute intracranial abnormality however did show mild age-appropriate atrophy evidence by scattered periventricular white matter hypodensity suggestive of chronic microvascular ischemic disease  In the ED patient received 1x dose of meclizine 25 mg  She was also given 1x dose of hydralazine 5 mg for BP control    Her neurologic exam was non-focal       ED Vital Signs:   ED Triage Vitals [09/21/17 1453]   Temperature Pulse Respirations Blood Pressure SpO2   (!) 97 4 °F (36 3 °C) 65 16 (!) 231/122 97 %      Temp Source Heart Rate Source Patient Position - Orthostatic VS BP Location FiO2 (%)   Oral Monitor Sitting Left arm --      Pain Score       No Pain        Wt Readings from Last 1 Encounters:   09/21/17 71 4 kg (157 lb 6 4 oz)       Vital Signs (abnormal): bp 174/79---231/122    Abnormal Labs/Diagnostic Test Results: bun 27,    ED Treatment:   Medication Administration from 09/21/2017 1414 to 09/21/2017 1911       Date/Time Order Dose Route Action Action by Comments     09/21/2017 7486 meclizine (ANTIVERT) tablet 25 mg 25 mg Oral Given Dory Seals Kei Massey RN           Past Medical/Surgical History: Active Ambulatory Problems     Diagnosis Date Noted    No Active Ambulatory Problems     Resolved Ambulatory Problems     Diagnosis Date Noted    No Resolved Ambulatory Problems     Past Medical History:   Diagnosis Date    Hyperlipidemia     Hypertension        Admitting Diagnosis: Vertigo [R42]  Hypertension [I10]  Hypertensive urgency [I16 0]    Age/Sex: 80 y o  female    Assessment/Plan: 1  Vertigo  · Differential diagnosis includes benign paroxysmal positional vertigo versus secondary to hypertensive urgency versus low likelihood of posterior circulation acute CVA  · CT head negative for any acute intracranial process  Low suspicion for acute CVA therefore stroke pathway and Neurology was not consulted  Non-focal examination  · Likely consider benign paroxysmal positional vertigo in the setting of possible sinusitis as an inciting factor  · Will restart meclizine at lowest dose of 15 mg daily given patient's concerns about drowsiness when she was on the medication a year ago when she last presented with vertigo  · Up and out of bed with assistance  · PT/OT consult  · Consider outpatient PT if BPPV  · Continue to monitor clinically  · Will defer decision to day team whether neurology consult is warranted if patient continues to have vertigo and/or develops neurologic deficits  · May consider Epley maneuver for further symptomatic support  · Repeat CBC and BMP in a m      2  Hypertensive urgency  · Blood pressure was 231/122 on presentation to the emergency department  · Consider relation to #1  · Unclear etiology  Patient is adamant that she has 100% complying with her antihypertensive medication regimen  Possibly in the setting of a sinus infection   viral? W/o leukocytosis/afebrile  · Patient received 1 time dose of 5 mg hydralazine as to not lower blood pressure more than 20% over the 1st 24 hours  · Will restart patient's home blood pressure medications starting tomorrow including atenolol 25 mg daily and losartan 50 mg daily  In the interim will give 5 mg p r n q6 IV  hydralazine for SBP greater than 190  · Will titrate BP medications as needed for target SBP less than 160     3  Hypertension  · See #2     4  Hyperlipidemia  · Continue fish oil supplementation  Patient is not on a statin and per outpatient records, however daughter reports she is  Will need to contact patient's pharmacy to confirm     5  Pre diabetes? · Patient reports history of pre diabetes controlled in the outpatient setting with diet and exercise  · Will obtain new A1c to confirm diagnosis as we do not have prior records confirming this diagnosis  · Continue to monitor blood glucose levels while inpatient  Pending A1c results may consider initiation of insulin sliding scale    Will provide cons carb diet in interim     Code Status: Level 1 - Full Code  VTE Pharmacologic Prophylaxis: Heparin   VTE Mechanical Prophylaxis: sequential compression device  Admission Status: OBSERVATION       Admission Orders:  Scheduled Meds:   aspirin 81 mg Oral Daily   atenolol 25 mg Oral Daily   calcium carbonate 1,250 mg Oral Daily   cholecalciferol 1,000 Units Oral Daily   fish oil 1,000 mg Oral BID   heparin (porcine) 5,000 Units Subcutaneous Q8H [address]   losartan 50 mg Oral Daily   meclizine 12 5 mg Oral Daily   multivitamin-minerals 1 tablet Oral Daily   pantoprazole 20 mg Oral Early Morning     Continuous Infusions:   sodium chloride 75 mL/hr Last Rate: 75 mL/hr (09/22/17 0810)     PRN Meds:   acetaminophen    hydrALAZINE     calcont  Diet  Pt/ot  Cl 109  [id number]--1 8

## 2017-09-22 NOTE — PHYSICAL THERAPY NOTE
Physical Therapy Evaluation    Patient's Name: Reina Almeida    Admitting Diagnosis  Vertigo [R42]  Hypertension [I10]  Hypertensive urgency [I16 0]    Problem List  Patient Active Problem List   Diagnosis    Hypertension    Hyperlipidemia    Vertigo    Hypertensive urgency    Prediabetes    Sinus pressure       Past Medical History  Past Medical History:   Diagnosis Date    Hyperlipidemia     Hypertension        Past Surgical History  Past Surgical History:   Procedure Laterality Date    HYSTERECTOMY          09/22/17 0905   Note Type   Note type Eval/Treat   Pain Assessment   Pain Assessment No/denies pain   Pain Score No Pain   Home Living   Type of Home (I living at [facility])   Home Layout One level   [address] [facility]  (occasional use)   Additional Comments Pt reports she is very I PTA attending exercise class several times a week   Prior Function   Level of [place] Independent with ADLs and functional mobility   ADL Assistance Independent   IADLs Independent   Restrictions/Precautions   Weight Bearing Precautions Per Order No   Other Precautions Multiple lines  (high BP)   General   Family/Caregiver Present No   Cognition   Orientation Level Oriented X4   RLE Assessment   RLE Assessment WFL   LLE Assessment   LLE Assessment WFL   Coordination   Movements are Fluid and Coordinated 1   Bed Mobility   Supine to Sit 5  Supervision   Sit to Supine 5  Supervision   Transfers   Sit to Stand 5  Supervision   Additional items Assist x 1; Increased time required   Stand to Sit 4  Minimal assistance   Additional items Assist x 1; Increased time required   Additional Comments BP supine 191/80, sitting 204/90 post 3' 215/93   Pt reports no dizziness or headache, nsg aware   Ambulation/Elevation   Gait pattern Shuffling   Gait Assistance 4  Minimal assist   Additional items Assist x 1   Assistive Device [facility] )   Distance 3'  (additional ambulation limited by high BP)   Balance   Static Sitting Good   Dynamic Sitting Fair +   Static Standing Fair   Dynamic Standing Fair -   Ambulatory Poor +   Endurance Deficit   Endurance Deficit Yes   Endurance Deficit Description limited by high BP   Activity Tolerance   Activity Tolerance Treatment limited secondary to medical complications (Comment)   Nurse Made Aware yes   Assessment   Prognosis Good   Problem List Decreased endurance;Decreased mobility; Decreased safety awareness   Assessment Pt is 80 y o  female seen for PT evaluation s/p admit to [facility] on 9/21/2017 w/ Vertigo  PT consulted to assess pt's functional mobility and d/c needs  Order placed for PT eval and tx, w/ up as tolerated order  Comorbidities affecting pt's physical performance at time of assessment include: HTN, HLD, prediabetes   PTA, pt was ambulates unrestricted distances and all terrain  Personal factors affecting pt at time of IE include: unable to perform dynamic tasks in community and unable to perform physical activity  Please find objective findings from PT assessment regarding body systems outlined above with impairments and limitations including gait deviations, decreased activity tolerance, decreased functional mobility tolerance and decreased safety awareness  Pt reports no dizziness throughout evaluation or headache  Limited assessment with high BP which slowly increased with minimal activity  The following objective measures performed on IE also reveal limitations: Barthel Index: 70/100  Pt's clinical presentation is currently unstable/unpredictable seen in pt's presentation of unstable BP  Pt to benefit from continued PT tx to address deficits as defined above and maximize level of functional independent mobility and consistency  From PT/mobility standpoint, recommendation at time of d/c would be OP PT pending progress in order to facilitate return to PLOF  Goals   Patient Goals TO get better and return home   STG Expiration Date 10/02/17   Short Term Goal #1 1   Complete bed mobility I  2  Complete transfers Mod I  3  Ambulate 200' with S  4  Improve balance to good  5  I with HEP  6  Improve strength to 4/5  Plan   Treatment/Interventions LE strengthening/ROM; Endurance training;Bed mobility;Gait training;Equipment eval/education;Patient/family training   Recommendation   Recommendation Outpatient PT  (vestibular therapy)   PT - OK to Discharge (anticipate pt will achieve goals when BP stable)   Barthel Index   Feeding 10   Bathing 5   Grooming Score 5   Dressing Score 10   Bladder Score 10   Bowels Score 10   Toilet Use Score 10   Transfers (Bed/Chair) Score 10   Mobility (Level Surface) Score 0   Stairs Score 0   Barthel Index Score 70            Alejandra Holguin, PT

## 2017-09-22 NOTE — H&P
INTERNAL MEDICINE HISTORY AND PHYSICAL  CW2 211-01 SOD Team B     NAME: Krista Reddy  AGE: 80 y o  SEX: female  : 1926   MRN: [id number]  ENCOUNTER: [id number]    DATE: 2017  TIME: 9:21 PM    Primary Care Physician: Prince Jasper MD  Admitting Provider: Prince Jasper MD    Chief complaint: Vertigo    History of Present Illness     Krista Reddy is a 80 y o  female with a past medical history of hypertension, hyperlipidemia, and prediabetes who presented to the emergency department with a chief complaint of vertigo  Patient states the vertigo began about 3 weeks ago  She does have a history of prior vertigo approximately 1 year ago and was put on meclizine by her PCP, however patient decided to stop taking this medication after a few doses as it made her too drowsy  She denies any further episodes of vertigo up until about 3 weeks ago  She states that it usually occurs when she turns her head a certain way, particularly at night when she is laying on her right side  She describes the sensation as the room spinning"  Nothing seems to alleviate her symptoms though sometimes closing her eyes helps and they are exacerbated with change position  She states that it usually resolves spontaneously  Currently she is without vertigo in the ED  Associated symptoms include nausea but no vomiting  No bladder or bowel incontinence  No fevers or chills  Denies any focal neurologic deficits, ataxia, or visual disturbances  No headaches  Of note, patient initially presented to a nearby urgent care facility earlier today as her daughter stated she did not look right as she appeared fatigued  The patient has had some sinus pressure, watery eyes and some bilateral ear pain with radiation to her jaw for about the past week, though otoscopic exam only shows cerumen without evidence of an otitis  At the urgent care office, patient was noted to be hypertensive with systolic greater than 766    On presentation to the ED, patient was noted to have a blood pressure of 220/120  Patient does not recall having history of severely elevated blood pressures, however she does not check her blood pressure routinely at home  She reports 100% compliance with her anti- hypertensive regimen  CT head without contrast was negative for any acute intracranial abnormality however did show mild age-appropriate atrophy evidence by scattered periventricular white matter hypodensity suggestive of chronic microvascular ischemic disease  In the ED patient received 1x dose of meclizine 25 mg  She was also given 1x dose of hydralazine 5 mg for BP control  Her neurologic exam was non-focal    Review of Systems   Review of Systems   Constitutional: Positive for fatigue  HENT: Positive for congestion, ear pain and sinus pain  Eyes: Positive for redness and visual disturbance  Respiratory: Negative for cough, choking, shortness of breath and wheezing  Cardiovascular: Negative for chest pain, palpitations and leg swelling  Gastrointestinal: Negative for abdominal distention, abdominal pain, anal bleeding, blood in stool, constipation, diarrhea, nausea and vomiting  Endocrine: Negative  Genitourinary: Negative for decreased urine volume, difficulty urinating, dysuria and frequency  Musculoskeletal: Negative  Skin: Negative  Allergic/Immunologic: Positive for environmental allergies  Neurological: Positive for dizziness  Negative for tremors, seizures, syncope, facial asymmetry, speech difficulty, weakness, light-headedness, numbness and headaches  Hematological: Negative  Psychiatric/Behavioral: Negative          Past Medical History     Past Medical History:   Diagnosis Date    Hyperlipidemia     Hypertension        Past Surgical History     Past Surgical History:   Procedure Laterality Date    HYSTERECTOMY         Social History     History   Alcohol Use No     History   Drug Use No     History   Smoking Status    Former Smoker   Smokeless Tobacco    Never Used       Family History   History reviewed  No pertinent family history  Medications Prior to Admission     Prior to Admission medications    Medication Sig Start Date End Date Taking? Authorizing Provider   aspirin 81 mg chewable tablet Chew 81 mg daily   Yes Historical Provider, MD   atenolol (TENORMIN) 25 mg tablet Take 25 mg by mouth daily   Yes Historical Provider, MD   calcium carbonate (OS-ESTEBAN) 600 MG tablet Take 600 mg by mouth daily   Yes Historical Provider, MD   cholecalciferol (VITAMIN D3) 1,000 units tablet Take 1,000 Units by mouth daily   Yes Historical Provider, MD   losartan (COZAAR) 50 mg tablet Take 25 mg by mouth daily Patient takes 1 and 1/2 pills daily  Yes Historical Provider, MD   Multiple Vitamins-Minerals (MULTIVITAMIN ADULT PO) Take 1 tablet by mouth daily   Yes Historical Provider, MD   Omega-3 Fatty Acids (FISH OIL) 1,000 mg Take 1,000 mg by mouth 2 (two) times a day   Yes Historical Provider, MD   omeprazole (PriLOSEC) 20 mg delayed release capsule Take 20 mg by mouth daily   Yes Historical Provider, MD       Allergies   No Known Allergies    Objective     Vitals:    09/21/17 1715 09/21/17 1741 09/21/17 1827 09/21/17 1919   BP:  (!) 192/80 (!) 229/191 (!) 180/90   Pulse: 60 58 65 64   Resp: 22 20 20 18   Temp:    (!) 97 1 °F (36 2 °C)   TempSrc:    Oral   SpO2: 95% 95% 95% 96%   Weight:    71 4 kg (157 lb 6 4 oz)   Height:    5' 2" (1 575 m)     Body mass index is 28 79 kg/m²  Intake/Output Summary (Last 24 hours) at 09/21/17 2121  Last data filed at 09/21/17 1949   Gross per 24 hour   Intake              950 ml   Output                0 ml   Net              950 ml     Invasive Devices     Peripheral Intravenous Line            Peripheral IV 09/21/17 Left Antecubital less than 1 day                Physical Exam  GENERAL: Appears well-developed and well-nourished  Appears in no acute distress   HEENT: Normocephalic and atraumatic  No scleral icterus  PERRLA  EOMI B/L  No oropharyngeal edema  MM moist +Cerumen impaction in Bilateral ears   NECK: Neck supple with no lymphadenopathy  Trachea midline  No JVD  CARDIOVASCULAR: S1 and S2 are present  Regular rate and rhythm  No murmurs, rubs, or gallops  RESPIRATORY: CTA B/L, no rales, rhonci or wheezes  Normal respiratory expansion  BREAST: Deferred  ABDOMINAL: Bowel sounds present in all 4 quadrants, non-tender, soft, non-distended  No organomegaly, rebound, or guarding  EXTREMITIES: 2+ DP and PT pulses bilaterally; no cyanosis, clubbing, edema  ROM intact  DAVILA x4   /GYN: Deferred  RECTAL: Deferred  BACK: No tenderness to palpation  No gross deformities  NEUROLOGIC: Patient is alert and oriented to person, place, and time  No sensory or motor deficits  CN 2-12 intact  Plantars downgoing bilaterally  Speech fluent  Motor stregnth 5/5 in bilateral upper and lower extremities  No ataxia   SKIN: Skin is warm and dry  No skin lesions are present  No rashes  PSYCHIATRIC: Normal mood and affect     Lab Results: I have personally reviewed pertinent reports      CBC:     Results from last 7 days  Lab Units 09/21/17  1559   WBC Thousand/uL 8 43   RBC Million/uL 4 69   HEMOGLOBIN g/dL 14 4   HEMATOCRIT % 41 5   MCV fL 89   MCH pg 30 7   MCHC g/dL 34 7   RDW % 12 7   MPV fL 10 3   PLATELETS Thousands/uL 267   NRBC AUTO /100 WBCs 0   NEUTROS PCT % 63   LYMPHS PCT % 29   MONOS PCT % 6   EOS PCT % 2   BASOS PCT % 0   NEUTROS ABS Thousands/µL 5 29   LYMPHS ABS Thousands/µL 2 43   MONOS ABS Thousand/µL 0 52   EOS ABS Thousand/µL 0 14   , Chemistry Profile:     Results from last 7 days  Lab Units 09/21/17  1559   SODIUM mmol/L 136   POTASSIUM mmol/L 4 3   CHLORIDE mmol/L 105   CO2 mmol/L 23   ANION GAP mmol/L 8   BUN mg/dL 27*   CREATININE mg/dL 1 24   GLUCOSE RANDOM mg/dL 97   CALCIUM mg/dL 9 9   EGFR ml/min/1 73sq m 38   , Coagulation Studies:   , Cardiac Studies:   , Additional Labs: Imaging: I have personally reviewed pertinent films in PACS  Ct Head Wo Contrast    Result Date: 9/21/2017  Narrative: CT BRAIN - WITHOUT CONTRAST INDICATION: Vertigo  COMPARISON:    None TECHNIQUE:  Multiple contiguous axial CT images were obtained at 2 5 mm intervals through the posterior fossa followed by 5 mm intervals through the remainder of the brain  This examination, like all CT scans performed in the [facility],  was performed utilizing techniques to minimize radiation dose exposure, including the use of iterative reconstruction and automated exposure control  Rad dosage 1038 65 mGy-cm IMAGE QUALITY:  Diagnostic FINDINGS:  PARENCHYMA: Mild age-appropriate atrophy  Scattered periventricular white matter hypodensity suggestive of chronic microvascular ischemic disease  No mass, mass effect or midline shift  No hemorrhage  No signs of acute ischemia  VENTRICLES AND EXTRA-AXIAL SPACES:    Mildly enlarged, consistent with the degree of atrophy  VISUALIZED ORBITS AND PARANASAL SINUSES:  Normal  CALVARIUM AND EXTRACRANIAL SOFT TISSUES:  Normal      Impression: 1  No acute intracranial pathology  Workstation performed: [id number]       Microbiology: None      EKG, Pathology, and Other Studies: I have personally reviewed pertinent reports  Medications given in Emergency Department     Medication Administration - last 24 hours from 09/20/2017 2121 to 09/21/2017 2121       Date/Time Order Dose Route Action Action by     09/21/2017 1745 meclizine (ANTIVERT) tablet 25 mg 25 mg Oral Given Kiya Haney RN     09/21/2017 1946 fish oil capsule 1,000 mg 1,000 mg Oral Given Lila Plascencia RN     09/21/2017 1949 sodium chloride 0 9 % infusion 75 mL/hr Intravenous Matheus 37 Lila Plascencia RN     09/21/2017 1946 hydrALAZINE (APRESOLINE) injection 5 mg 5 mg Intravenous Given Lila Plascencia RN          Assessment and Plan     1    Vertigo  · Differential diagnosis includes benign paroxysmal positional vertigo versus secondary to hypertensive urgency versus low likelihood of posterior circulation acute CVA  · CT head negative for any acute intracranial process  Low suspicion for acute CVA therefore stroke pathway and Neurology was not consulted  Non-focal examination  · Likely consider benign paroxysmal positional vertigo in the setting of possible sinusitis as an inciting factor  · Will restart meclizine at lowest dose of 15 mg daily given patient's concerns about drowsiness when she was on the medication a year ago when she last presented with vertigo  · Up and out of bed with assistance  · PT/OT consult  · Consider outpatient PT if BPPV  · Continue to monitor clinically  · Will defer decision to day team whether neurology consult is warranted if patient continues to have vertigo and/or develops neurologic deficits  · May consider Epley maneuver for further symptomatic support  · Repeat CBC and BMP in a m  2   Hypertensive urgency  · Blood pressure was 231/122 on presentation to the emergency department  · Consider relation to #1  · Unclear etiology  Patient is adamant that she has 100% complying with her antihypertensive medication regimen  Possibly in the setting of a sinus infection   viral? W/o leukocytosis/afebrile  · Patient received 1 time dose of 5 mg hydralazine as to not lower blood pressure more than 20% over the 1st 24 hours  · Will restart patient's home blood pressure medications starting tomorrow including atenolol 25 mg daily and losartan 50 mg daily  In the interim will give 5 mg p r n q6 IV  hydralazine for SBP greater than 190  · Will titrate BP medications as needed for target SBP less than 160    3  Hypertension  · See #2    4  Hyperlipidemia  · Continue fish oil supplementation  Patient is not on a statin and per outpatient records, however daughter reports she is  Will need to contact patient's pharmacy to confirm    5  Pre diabetes?   · Patient reports history of pre diabetes controlled in the outpatient setting with diet and exercise  · Will obtain new A1c to confirm diagnosis as we do not have prior records confirming this diagnosis  · Continue to monitor blood glucose levels while inpatient  Pending A1c results may consider initiation of insulin sliding scale  Will provide cons carb diet in interim    Code Status: Level 1 - Full Code  VTE Pharmacologic Prophylaxis: Heparin   VTE Mechanical Prophylaxis: sequential compression device  Admission Status: OBSERVATION    Admission Time  I spent 1 hour admitting the patient  This involved direct patient contact where I performed a full history and physical, reviewing previous records, and reviewing laboratory and other diagnostic studies      Carmen Real MD  Internal Medicine  PGY-2

## 2017-09-23 RX ORDER — CARVEDILOL 12.5 MG/1
12.5 TABLET ORAL 2 TIMES DAILY WITH MEALS
Status: DISCONTINUED | OUTPATIENT
Start: 2017-09-23 | End: 2017-09-24 | Stop reason: HOSPADM

## 2017-09-23 RX ADMIN — HEPARIN SODIUM 5000 UNITS: 5000 INJECTION, SOLUTION INTRAVENOUS; SUBCUTANEOUS at 06:33

## 2017-09-23 RX ADMIN — ASPIRIN 81 MG 81 MG: 81 TABLET ORAL at 09:58

## 2017-09-23 RX ADMIN — Medication 1 TABLET: at 10:01

## 2017-09-23 RX ADMIN — CARVEDILOL 12.5 MG: 12.5 TABLET, FILM COATED ORAL at 17:36

## 2017-09-23 RX ADMIN — HEPARIN SODIUM 5000 UNITS: 5000 INJECTION, SOLUTION INTRAVENOUS; SUBCUTANEOUS at 21:18

## 2017-09-23 RX ADMIN — Medication 1250 MG: at 09:59

## 2017-09-23 RX ADMIN — PANTOPRAZOLE SODIUM 20 MG: 20 TABLET, DELAYED RELEASE ORAL at 06:33

## 2017-09-23 RX ADMIN — CARVEDILOL 6.25 MG: 6.25 TABLET, FILM COATED ORAL at 06:32

## 2017-09-23 RX ADMIN — Medication 1000 MG: at 10:00

## 2017-09-23 RX ADMIN — HEPARIN SODIUM 5000 UNITS: 5000 INJECTION, SOLUTION INTRAVENOUS; SUBCUTANEOUS at 14:56

## 2017-09-23 RX ADMIN — LABETALOL 20 MG/4 ML (5 MG/ML) INTRAVENOUS SYRINGE 10 MG: at 23:12

## 2017-09-23 RX ADMIN — VITAMIN D, TAB 1000IU (100/BT) 1000 UNITS: 25 TAB at 09:58

## 2017-09-23 RX ADMIN — LOSARTAN POTASSIUM: 50 TABLET, FILM COATED ORAL at 10:01

## 2017-09-23 RX ADMIN — Medication 1000 MG: at 17:36

## 2017-09-23 NOTE — PROGRESS NOTES
IM Residency Progress Note   Unit/Bed#: CW2 210-01 Encounter: [id number]  SOD Team B       Nahomy Jones 80 y o  female [id number]    Hospital Stay Days: 0      Assessment/Plan:    Principal Problem:    Vertigo  Active Problems:    Hypertension    Hyperlipidemia    Hypertensive urgency    Prediabetes    Sinus pressure    Vertigo  2/2 to BBPV as diagnosed by Epley maneuver yesterday  Patient will need outpatient rehab with [facility]  No inpatient PT trained to treat BBPV    Hypertensive Urgency  Patient came in with high BPs with first BP recorded 231/122  Currently slightly improved but still significantly high ~ SBP 190s    We are adjusting patient's medications  She came in on losartan and atenolol  Currently we have her on losartan/hctz 50/12 5 and carvedilol 12 5 BID  Will uptitrate as tolerated  PRN labetalol for SBP > 160    DM II  A1C came back at 6 8  With patient's age, this is an acceptable a1c  We will not start her on hyperglycemic therapy  Hyperlipidemia  Continue fish oil, aspirin 81mg    Disposition: continue inpatient treatment to achieve better BP control       Subjective:   Patient seen and examined  Vitals: Temp (24hrs), Av 1 °F (36 2 °C), Min:96 7 °F (35 9 °C), Max:97 7 °F (36 5 °C)  Current: Temperature: (!) 96 8 °F (36 °C)  Vitals:    17 0956 17 1018 17 1515 17 2300   BP: (!) 187/80 156/65 (!) 188/73 (!) 192/76   Pulse: 64 63 63 68   Resp:   18 18   Temp:   (!) 96 7 °F (35 9 °C) (!) 96 8 °F (36 °C)   TempSrc:   Tympanic Tympanic   SpO2:   96% 96%   Weight:       Height:        Body mass index is 28 79 kg/m²  I/O last 24 hours: In: 7274 [P O :720; I V :950]  Out: 450 [Urine:450]      Physical Exam: General appearance: alert, appears stated age and cooperative  Head: Normocephalic, without obvious abnormality, atraumatic  Eyes: conjunctivae/corneas clear  PERRL, EOM's intact  Fundi benign    Neck: no JVD and supple, symmetrical, trachea midline  Lungs: clear to auscultation bilaterally  Heart: regular rate and rhythm, S1, S2 normal, no murmur, click, rub or gallop  Abdomen: soft, non-tender; bowel sounds normal; no masses,  no organomegaly  Extremities: extremities normal, atraumatic, no cyanosis or edema  Skin: Skin color, texture, turgor normal  No rashes or lesions  Neurologic: Grossly normal     Invasive Devices     Peripheral Intravenous Line            Peripheral IV 09/21/17 Left Antecubital 1 day                Labs: No results found for this or any previous visit (from the past 24 hour(s))  Radiology Results: I have personally reviewed pertinent reports  Other Diagnostic Testing:   I have personally reviewed pertinent reports  Active Meds:   Current Facility-Administered Medications   Medication Dose Route Frequency    acetaminophen (TYLENOL) tablet 650 mg  650 mg Oral Q6H PRN    aspirin chewable tablet 81 mg  81 mg Oral Daily    calcium carbonate (TUMS) chewable tablet 1,250 mg  1,250 mg Oral Daily    carvedilol (COREG) tablet 6 25 mg  6 25 mg Oral BID With Meals    cholecalciferol (VITAMIN D3) tablet 1,000 Units  1,000 Units Oral Daily    fish oil capsule 1,000 mg  1,000 mg Oral BID    heparin (porcine) subcutaneous injection 5,000 Units  5,000 Units Subcutaneous Q8H [address]    labetalol (NORMODYNE) injection 10 mg  10 mg Intravenous Q6H PRN    losartan potassium-hydrochlorothiazide (HYZAAR 50/12  5) combination   Oral Daily    multivitamin-minerals (CENTRUM) tablet 1 tablet  1 tablet Oral Daily    pantoprazole (PROTONIX) EC tablet 20 mg  20 mg Oral Early Morning    sodium chloride 0 9 % infusion  75 mL/hr Intravenous Continuous       VTE Pharmacologic Prophylaxis: Heparin  VTE Mechanical Prophylaxis: sequential compression device    Lamar Mcgarry MD

## 2017-09-24 VITALS
HEIGHT: 62 IN | HEART RATE: 77 BPM | OXYGEN SATURATION: 95 % | WEIGHT: 157.4 LBS | BODY MASS INDEX: 28.97 KG/M2 | RESPIRATION RATE: 18 BRPM | SYSTOLIC BLOOD PRESSURE: 137 MMHG | TEMPERATURE: 98.7 F | DIASTOLIC BLOOD PRESSURE: 78 MMHG

## 2017-09-24 RX ORDER — NEBULIZER AND COMPRESSOR
EACH MISCELLANEOUS ONCE
Qty: 1 EACH | Refills: 0 | Status: SHIPPED | OUTPATIENT
Start: 2017-09-24 | End: 2022-03-03

## 2017-09-24 RX ORDER — LOSARTAN POTASSIUM AND HYDROCHLOROTHIAZIDE 12.5; 5 MG/1; MG/1
1 TABLET ORAL DAILY
Qty: 30 TABLET | Refills: 0 | Status: SHIPPED | OUTPATIENT
Start: 2017-09-24 | End: 2020-07-30 | Stop reason: CLARIF

## 2017-09-24 RX ORDER — CARVEDILOL 12.5 MG/1
12.5 TABLET ORAL 2 TIMES DAILY WITH MEALS
Qty: 60 TABLET | Refills: 0 | Status: SHIPPED | OUTPATIENT
Start: 2017-09-24 | End: 2021-03-22 | Stop reason: HOSPADM

## 2017-09-24 RX ADMIN — PANTOPRAZOLE SODIUM 20 MG: 20 TABLET, DELAYED RELEASE ORAL at 05:26

## 2017-09-24 RX ADMIN — LOSARTAN POTASSIUM: 50 TABLET, FILM COATED ORAL at 10:43

## 2017-09-24 RX ADMIN — VITAMIN D, TAB 1000IU (100/BT) 1000 UNITS: 25 TAB at 08:22

## 2017-09-24 RX ADMIN — Medication 1 TABLET: at 08:21

## 2017-09-24 RX ADMIN — HEPARIN SODIUM 5000 UNITS: 5000 INJECTION, SOLUTION INTRAVENOUS; SUBCUTANEOUS at 05:26

## 2017-09-24 RX ADMIN — ASPIRIN 81 MG 81 MG: 81 TABLET ORAL at 08:22

## 2017-09-24 RX ADMIN — CARVEDILOL 12.5 MG: 12.5 TABLET, FILM COATED ORAL at 08:28

## 2017-09-24 RX ADMIN — Medication 1250 MG: at 08:22

## 2017-09-24 RX ADMIN — Medication 1000 MG: at 08:22

## 2017-09-24 NOTE — PROGRESS NOTES
Patients BP elevated at this time, /84, taken manually  IVF's stopped as per nursing judgment  SOD B paged and made aware  Awaiting new order for discontinuing fluids  Will continue to monitor patient

## 2017-09-24 NOTE — DISCHARGE SUMMARY
IMR Discharge Summary - Medical Shin Alicia 80 y o  female MRN: [id number]    [address]      Room / Bed: /-01 Encounter: [id number]    BRIEF OVERVIEW    Admitting Provider: Deangelo Fulton MD  Discharge Provider: Deangelo Fulton MD  Primary Care Physician at Discharge: Kevin Reynoso MD    Discharge To:    Facility / Family Member Name:   Phone Number:    Admission Date: 9/21/2017     Discharge Date: No discharge date for patient encounter  Primary Discharge Diagnosis  Principal Problem:    Vertigo  Active Problems:    Hypertension    Hyperlipidemia    Hypertensive urgency    Prediabetes    Sinus pressure  Resolved Problems:    * No resolved hospital problems  *    Vertigo  2/2 to BBPV as diagnosed by Epley maneuver  Patient will need outpatient rehab with [facility]  No inpatient PT trained to treat BBPV     Hypertensive Urgency  Patient came in with high BPs with first BP recorded 231/122  Currently slightly improved but still high overnight up to 200/84  Most recently 152/67  Patient on losartan and atenolol at home  Will discontinue and discharge home on losartan/hctz 50/12 5 po daily and carvedilol 12 5mg po BID       DM II  hba1c 6 8 on 9/22  With patient's age, this is an acceptable a1c  We will not start her on hyperglycemic therapy       Hyperlipidemia  Continue fish oil, aspirin 81mg    Other Problems Addressed:    Consulting Providers   none    Therapeutic Operative Procedures Performed  none    Diagnostic Procedures Performed    Results from last 7 days  Lab Units 09/22/17  0436 09/21/17  1559   SODIUM mmol/L 140 136   POTASSIUM mmol/L 3 9 4 3   CHLORIDE mmol/L 109* 105   CO2 mmol/L 24 23   BUN mg/dL 24 27*   CREATININE mg/dL 1 05 1 24   CALCIUM mg/dL 8 8 9 9   GLUCOSE RANDOM mg/dL 108 97     Ct Head Wo Contrast    Result Date: 9/21/2017  Impression: 1  No acute intracranial pathology    Workstation performed: [id number]       Discharge Disposition: Final discharge disposition not confirmed  Discharged With Lines: no    Test Results Pending at Discharge:      Outpatient Follow-Up  Follow up with Mihaela Mccain MD within a week for management of BPPV and hypertension  Follow up with consulting providers  none required   Active Issues Requiring Follow-up   Hypertension and BPPV    Code Status: Level 1 - Full Code  Advance Directive and Living Will: <no information>  Power of :    POLST:      Medications   Your Medications     STOP taking these medications     STOP taking these medications    atenolol 25 mg tablet   Commonly known as: TENORMIN     losartan 50 mg tablet   Commonly known as: COZAAR    TAKE these medications     TAKE these medications     Morning Afternoon Evening Bedtime As Needed    Adult Blood Pressure Cuff Lg Kit   by Does not apply route once for 1 dose   Refills: 0                    aspirin 81 mg chewable tablet   Chew 81 mg daily   Refills: 0                    calcium carbonate 600 MG tablet   Commonly known as: OS-ESTEBAN   Take 600 mg by mouth daily   Refills: 0                    carvedilol 12 5 mg tablet   Commonly known as: COREG   Take 1 tablet by mouth 2 (two) times a day with meals for 30 days   Refills: 0                    cholecalciferol 1,000 units tablet   Commonly known as: VITAMIN D3   Take 1,000 Units by mouth daily   Refills: 0                    fish oil 1,000 mg   Take 1,000 mg by mouth 2 (two) times a day   Refills: 0                    losartan-hydrochlorothiazide 50-12 5 mg per tablet   Commonly known as: HYZAAR   Take 1 tablet by mouth daily for 30 days   Refills: 0                    MULTIVITAMIN ADULT PO   Take 1 tablet by mouth daily   Refills: 0                    omeprazole 20 mg delayed release capsule   Commonly known as: PriLOSEC   Take 20 mg by mouth daily   Refills: 0               Allergies  No Known Allergies  Discharge Diet: regular diet  Activity restrictions: none    Mono Lerma is a 80 y o  female with a past medical history of hypertension, hyperlipidemia, and prediabetes who presented to the emergency department with a chief complaint of vertigo  Patient states the vertigo began about 3 weeks ago  She does have a history of prior vertigo approximately 1 year ago and was put on meclizine by her PCP, however patient decided to stop taking this medication after a few doses as it made her too drowsy  She denies any further episodes of vertigo up until about 3 weeks ago  She states that it usually occurs when she turns her head a certain way, particularly at night when she is laying on her right side  She describes the sensation as the room spinning"  Nothing seems to alleviate her symptoms though sometimes closing her eyes helps and they are exacerbated with change position  She states that it usually resolves spontaneously  Currently she is without vertigo in the ED  Associated symptoms include nausea but no vomiting  No bladder or bowel incontinence  No fevers or chills  Denies any focal neurologic deficits, ataxia, or visual disturbances  No headaches  Of note, patient initially presented to a nearby urgent care facility earlier today as her daughter stated she did not look right as she appeared fatigued  The patient has had some sinus pressure, watery eyes and some bilateral ear pain with radiation to her jaw for about the past week, though otoscopic exam only shows cerumen without evidence of an otitis  At the urgent care office, patient was noted to be hypertensive with systolic greater than 170  On presentation to the ED, patient was noted to have a blood pressure of 220/120  Patient does not recall having history of severely elevated blood pressures, however she does not check her blood pressure routinely at home  She reports 100% compliance with her anti- hypertensive regimen    CT head without contrast was negative for any acute intracranial abnormality however did show mild age-appropriate atrophy evidence by scattered periventricular white matter hypodensity suggestive of chronic microvascular ischemic disease  In the ED patient received 1x dose of meclizine 25 mg  She was also given 1x dose of hydralazine 5 mg for BP control  Her neurologic exam was non-focal    Throughout her hospital course patient's vertigo improved but her her blood pressures were still high so she was started on carvedilol and hyzaar and the dosages were titrated upwards as needed for better blood pressure control  On the evening of 9/23/17 she had blood pressure reading 200/84, IV fluids were discontinued  Repeat measurement in the morning 137/70  She was medically cleared for discharge later that day with instructions to follow up with her primary care provider Dr Farshad Cedillo within a week for management of BPPV and hypertension  She was instructed to discontinue her home atenolol and losartan and continue her carvedilol and Hyzaar that was started in the hospital     Presenting Problem/History of Present Illness  Principal Problem:    Vertigo  Active Problems:    Hypertension    Hyperlipidemia    Hypertensive urgency    Prediabetes    Sinus pressure  Resolved Problems:    * No resolved hospital problems  *        Other Pertinent Test Results      Discharge Condition: good      Discharge  Statement   I spent 1 hour minutes discharging the patient  This time was spent on the day of discharge  I had direct contact with the patient on the day of discharge  Additional documentation is required if more than 30 minutes were spent on discharge

## 2017-09-24 NOTE — DISCHARGE INSTRUCTIONS
For your Dizziness with Recommend to follow up at:    [facility], [address]  [phone]  Please call and make appoitment

## 2017-09-24 NOTE — PROGRESS NOTES
IM Residency Progress Note   Unit/Bed#: - Encounter: [id number]  SOD Team B       Brandee Lim 80 y o  female [id number]    Hospital Stay Days: 1      Assessment/Plan:    Principal Problem:    Vertigo  Active Problems:    Hypertension    Hyperlipidemia    Hypertensive urgency    Prediabetes    Sinus pressure    Vertigo  2/2 to BBPV as diagnosed by Epley maneuver  Patient will need outpatient rehab with [facility]  No inpatient PT trained to treat BBPV    Hypertensive Urgency  Patient came in with high BPs with first BP recorded 231/122  Currently slightly improved but still high overnight up to 200/84  Most recently 152/67  Patient on losartan and atenolol at home  Currently on losartan/hctz 50/12 5 po daily and carvedilol 12 5mg po BID  Will uptitrate as tolerated  PRN labetalol for SBP > 160    DM II  hba1c 6 8 on   With patient's age, this is an acceptable a1c  We will not start her on hyperglycemic therapy  Hyperlipidemia  Continue fish oil, aspirin 81mg    Disposition: continue inpatient treatment to achieve better BP control     Subjective:   Overnight, blood pressures elevated to 200/84  IVF was stopped at the time  Most recent /67  Otherwise no acute events  Patient has been tolerating diet, ambulating, voiding and passing stool  Denies headaches, fevers, chills, chest pain, sob, gi or gu symptoms  Vitals: Temp (24hrs), Av 8 °F (36 6 °C), Min:97 4 °F (36 3 °C), Max:98 2 °F (36 8 °C)  Current: Temperature: 97 8 °F (36 6 °C)  Vitals:    17 2100 17 2301 17 2345 17 0300   BP: (!) 200/84 (!) 199/82 170/73 152/67   Pulse: 70 70 69 66   Resp: 16 18  20   Temp: 97 9 °F (36 6 °C) 97 8 °F (36 6 °C)     TempSrc: Oral Oral     SpO2: 95% 95%     Weight:       Height:        Body mass index is 28 79 kg/m²  I/O last 24 hours: In: -   Out: 600 [Urine:600]      Physical Exam:   General: asleep in bed but easily awakened   No acute distress  HEENT: nc/at  Perrl, eomi  Cardiovascular: s1/s2 present no murmurs  Pulmonary: clear to auscultation bilaterally  Abdominal: soft, nontender, nondistended  Extremities: no pitting edema      Invasive Devices     Peripheral Intravenous Line            Peripheral IV 09/21/17 Left Antecubital 2 days                Labs: No results found for this or any previous visit (from the past 24 hour(s))  Radiology Results: I have personally reviewed pertinent reports  Ct Head Wo Contrast    Result Date: 9/21/2017  Impression: 1  No acute intracranial pathology  Workstation performed: [id number]       Other Diagnostic Testing:   I have personally reviewed pertinent reports  Active Meds:   Current Facility-Administered Medications   Medication Dose Route Frequency    acetaminophen (TYLENOL) tablet 650 mg  650 mg Oral Q6H PRN    aspirin chewable tablet 81 mg  81 mg Oral Daily    calcium carbonate (TUMS) chewable tablet 1,250 mg  1,250 mg Oral Daily    carvedilol (COREG) tablet 12 5 mg  12 5 mg Oral BID With Meals    cholecalciferol (VITAMIN D3) tablet 1,000 Units  1,000 Units Oral Daily    fish oil capsule 1,000 mg  1,000 mg Oral BID    heparin (porcine) subcutaneous injection 5,000 Units  5,000 Units Subcutaneous Q8H [address]    labetalol (NORMODYNE) injection 10 mg  10 mg Intravenous Q6H PRN    losartan potassium-hydrochlorothiazide (HYZAAR 50/12  5) combination   Oral Daily    multivitamin-minerals (CENTRUM) tablet 1 tablet  1 tablet Oral Daily    pantoprazole (PROTONIX) EC tablet 20 mg  20 mg Oral Early Morning       VTE Pharmacologic Prophylaxis: Heparin  VTE Mechanical Prophylaxis: sequential compression device    Shelia Ferrari MD

## 2017-09-25 NOTE — CASE MANAGEMENT
Initial Clinical Review    Admission: Date/Time/Statement: Observation 9/21 and Changed to Inpatient on 9/23 @ 0902     Inpatient Admission     Standing Status:   Standing     Number of Occurrences:   1     Order Specific Question:   Admitting Physician     Answer:   Savanah De La Fuente     Order Specific Question:   Level of Care     Answer:   Med Surg [16]     Order Specific Question:   Estimated length of stay     Answer:   More than 2 Midnights     Order Specific Question:   Certification     Answer:   I certify that inpatient services are medically necessary for this patient for a duration of greater than two midnights  See H&P and MD Progress Notes for additional information about the patient's course of treatment  ED: Date/Time/Mode of Arrival:   ED Arrival Information     Expected Arrival Acuity Means of Arrival Escorted By Service Admission Type    9/21/2017 14:14 9/21/2017 14:41 Emergent Walk-In Self General Medicine Emergency    Arrival Complaint    Hypertension Urgency        Chief Complaint:   Chief Complaint   Patient presents with    Hypertension     Pt was at urgent care because of "not feeling right", jaw throat pain  Pt sent here for hypertension  History of Illness: 80 y o  female with a past medical history of hypertension, hyperlipidemia, and prediabetes who presented to the emergency department with a chief complaint of vertigo  Patient states the vertigo began about 3 weeks ago  She does have a history of prior vertigo approximately 1 year ago and was put on meclizine by her PCP, however patient decided to stop taking this medication after a few doses as it made her too drowsy  She denies any further episodes of vertigo up until about 3 weeks ago  She states that it usually occurs when she turns her head a certain way, particularly at night when she is laying on her right side  She describes the sensation as the room spinning"    Nothing seems to alleviate her symptoms though sometimes closing her eyes helps and they are exacerbated with change position  She states that it usually resolves spontaneously  Currently she is without vertigo in the ED  Associated symptoms include nausea but no vomiting  Of note, patient initially presented to a nearby urgent care facility earlier today as her daughter stated she did not look right as she appeared fatigued  The patient has had some sinus pressure, watery eyes and some bilateral ear pain with radiation to her jaw for about the past week, though otoscopic exam only shows cerumen without evidence of an otitis  At the urgent care office, patient was noted to be hypertensive with systolic greater than 550  On presentation to the ED, patient was noted to have a blood pressure of 220/120  Patient does not recall having history of severely elevated blood pressures, however she does not check her blood pressure routinely at home  She reports 100% compliance with her anti- hypertensive regimen  In the ED patient received 1x dose of meclizine 25 mg  She was also given 1x dose of hydralazine 5 mg for BP control    Her neurologic exam was non-focal     ED Vital Signs:   ED Triage Vitals [09/21/17 1453]   Temperature Pulse Respirations Blood Pressure SpO2   (!) 97 4 °F (36 3 °C) 65 16 (!) 231/122 97 %      Temp Source Heart Rate Source Patient Position - Orthostatic VS BP Location FiO2 (%)   Oral Monitor Sitting Left arm --      Pain Score       No Pain          Wt Readings from Last 1 Encounters:   09/21/17 71 4 kg (157 lb 6 4 oz)     Vital Signs (abnormal): bp 174/79---231/122  Date/Time  Temp  Pulse  Resp  BP  SpO2  O2 Device  Patient Position - Orthostatic VS     09/24/17 0823  97 9 °F (36 6 °C)  69  16   195/84  95 %  None (Room air)  --   09/24/17 0300  --  66  20  152/67  --  --  Lying   09/23/17 2345  --  69  --  170/73  --  None (Room air)  Lying   09/23/17 2301  97 8 °F (36 6 °C)  70  18   199/82  95 %  None (Room air)  Lying   09/23/17 2100  97 9 °F (36 6 °C)  70  16   200/84  95 %         09/22/17 2300   96 8 °F (36 °C)  68  18   192/76  96 %  None (Room air)  Lying   09/22/17 1515   96 7 °F (35 9 °C)  63  18   188/73  96 %  None (Room air)  Lying   09/22/17 1018  --  63  --  156/65  --  --  --   09/22/17 0956  --  64  --   187/80           Abnormal Labs/Diagnostic Test Results: bun 27,    ED Treatment:   Medication Administration from 09/21/2017 1414 to 09/21/2017 1911       Date/Time Order Dose Route Action     09/21/2017 1745 meclizine (ANTIVERT) tablet 25 mg 25 mg Oral Given     Past Medical/Surgical History: Active Ambulatory Problems     Diagnosis Date Noted    No Active Ambulatory Problems     Resolved Ambulatory Problems     Diagnosis Date Noted    No Resolved Ambulatory Problems     Past Medical History:   Diagnosis Date    Hyperlipidemia     Hypertension      Admitting Diagnosis: Vertigo [R42]  Hypertension [I10]  Hypertensive urgency [I16 0]    Age/Sex: 80 y o  female    Assessment/Plan: 1  Vertigo  · Differential diagnosis includes benign paroxysmal positional vertigo versus secondary to hypertensive urgency versus low likelihood of posterior circulation acute CVA  · CT head negative for any acute intracranial process  Low suspicion for acute CVA therefore stroke pathway and Neurology was not consulted    Non-focal examination  · Likely consider benign paroxysmal positional vertigo in the setting of possible sinusitis as an inciting factor  · Will restart meclizine at lowest dose of 15 mg daily given patient's concerns about drowsiness when she was on the medication a year ago when she last presented with vertigo  · Up and out of bed with assistance  · PT/OT consult  · Consider outpatient PT if BPPV  · Continue to monitor clinically  · Will defer decision to day team whether neurology consult is warranted if patient continues to have vertigo and/or develops neurologic deficits  · May consider Epley maneuver for further symptomatic support  · Repeat CBC and BMP in a m      2  Hypertensive urgency  · Blood pressure was 231/122 on presentation to the emergency department  · Consider relation to #1  · Unclear etiology  Patient is adamant that she has 100% complying with her antihypertensive medication regimen  Possibly in the setting of a sinus infection   viral? W/o leukocytosis/afebrile  · Patient received 1 time dose of 5 mg hydralazine as to not lower blood pressure more than 20% over the 1st 24 hours  · Will restart patient's home blood pressure medications starting tomorrow including atenolol 25 mg daily and losartan 50 mg daily  In the interim will give 5 mg p r n q6 IV  hydralazine for SBP greater than 190  · Will titrate BP medications as needed for target SBP less than 160     3  Hypertension  · See #2     4  Hyperlipidemia  · Continue fish oil supplementation  Patient is not on a statin and per outpatient records, however daughter reports she is  Will need to contact patient's pharmacy to confirm     5  Pre diabetes? · Patient reports history of pre diabetes controlled in the outpatient setting with diet and exercise  · Will obtain new A1c to confirm diagnosis as we do not have prior records confirming this diagnosis  · Continue to monitor blood glucose levels while inpatient  Pending A1c results may consider initiation of insulin sliding scale    Will provide cons carb diet in interim     Code Status: Level 1 - Full Code  VTE Pharmacologic Prophylaxis: Heparin   VTE Mechanical Prophylaxis: sequential compression device       Admission Orders:  PT/OT eval and treat    Scheduled Meds:   aspirin chewable tablet 81 mg  Dose: 81 mg Freq: Daily Route: PO    calcium carbonate (TUMS) chewable tablet 1,250 mg  Dose: 1,250 mg Freq: Daily Route: PO    carvedilol (COREG) tablet 12 5 mg  Dose: 12 5 mg Freq: 2 times daily with meals Route: PO    heparin (porcine) subcutaneous injection 5,000 Units  Dose: 5,000 Units Freq: Every 8 hours scheduled Route: SC    losartan potassium-hydrochlorothiazide (HYZAAR 50/12  5) combination  Freq: Daily Route: PO    multivitamin-minerals (CENTRUM) tablet 1 tablet  Dose: 1 tablet Freq: Daily Route: PO    pantoprazole (PROTONIX) EC tablet 20 mg  Dose: 20 mg Freq: Daily (early morning) Route: PO    Continuous Infusions:   sodium chloride 0 9 % infusion  Rate: 75 mL/hr Freq: Continuous Route: IV    PRN Meds:    Labetalol Iv x2    calcont  Diet  Pt/ot  Cl 109  tvh1s--3 8    ---------------------------------------------------------------------------------------------------------------    Physician progress notes:  Assessment/Plan:     Principal Problem:    Vertigo  Active Problems:    Hypertension    Hyperlipidemia    Hypertensive urgency    Prediabetes    Sinus pressure     Vertigo  2/2 to BBPV as diagnosed by Epley maneuver yesterday  Patient will need outpatient [facility] with [facility]  No inpatient PT trained to treat BBPV     Hypertensive Urgency  Patient came in with high BPs with first BP recorded 231/122  Currently slightly improved but still significantly high ~ SBP 190s     We are adjusting patient's medications  She came in on losartan and atenolol  Currently we have her on losartan/hctz 50/12 5 and carvedilol 12 5 BID  Will uptitrate as tolerated      PRN labetalol for SBP > 160     DM II  A1C came back at 6 8  With patient's age, this is an acceptable a1c   We will not start her on hyperglycemic therapy       Hyperlipidemia  Continue fish oil, aspirin 81mg     Disposition: continue inpatient treatment to achieve better BP control

## 2017-09-25 NOTE — CASE MANAGEMENT
Notification of Discharge  This is a Notification of Discharge from our facility [address]  Please be advised that this patient has been discharge from our facility  Below you will find the admission and discharge date and time including the patients disposition  PRESENTATION DATE: 9/21/2017  3:08 PM  IP ADMISSION DATE: 9/23/17 0902  DISCHARGE DATE: 9/24/2017  2:46 PM  DISPOSITION: [address] [place] [address] in the [facility] by Chemo Aguilera for 2017  Network Utilization Review Department  Phone: [phone]; Fax [phone]  ATTENTION: The Network Utilization Review Department is now centralized for our 7 Facilities  Make a note that we have a new phone and fax numbers for our Department  Please call with any questions or concerns to [phone] and carefully follow the prompts so that you are directed to the right person  All voicemails are confidential  Fax any determinations, approvals, denials, and requests for initial or continue stay review clinical to [phone]  Due to HIGH CALL volume, it would be easier if you could please send faxed requests to expedite your requests and in part, help us provide discharge notifications faster

## 2017-10-12 ENCOUNTER — APPOINTMENT (OUTPATIENT)
Dept: LAB | Age: 82
End: 2017-10-12
Payer: COMMERCIAL

## 2017-10-12 ENCOUNTER — TRANSCRIBE ORDERS (OUTPATIENT)
Dept: ADMINISTRATIVE | Age: 82
End: 2017-10-12

## 2017-10-12 DIAGNOSIS — I10 ESSENTIAL HYPERTENSION, MALIGNANT: Primary | ICD-10-CM

## 2017-10-12 DIAGNOSIS — I10 ESSENTIAL HYPERTENSION, MALIGNANT: ICD-10-CM

## 2017-10-12 LAB
ANION GAP SERPL CALCULATED.3IONS-SCNC: 5 MMOL/L (ref 4–13)
BUN SERPL-MCNC: 27 MG/DL (ref 5–25)
CALCIUM SERPL-MCNC: 8.9 MG/DL (ref 8.3–10.1)
CHLORIDE SERPL-SCNC: 102 MMOL/L (ref 100–108)
CO2 SERPL-SCNC: 27 MMOL/L (ref 21–32)
CREAT SERPL-MCNC: 1.38 MG/DL (ref 0.6–1.3)
GFR SERPL CREATININE-BSD FRML MDRD: 34 ML/MIN/1.73SQ M
GLUCOSE SERPL-MCNC: 108 MG/DL (ref 65–140)
POTASSIUM SERPL-SCNC: 4 MMOL/L (ref 3.5–5.3)
SODIUM SERPL-SCNC: 134 MMOL/L (ref 136–145)

## 2017-10-12 PROCEDURE — 36415 COLL VENOUS BLD VENIPUNCTURE: CPT

## 2017-10-12 PROCEDURE — 80048 BASIC METABOLIC PNL TOTAL CA: CPT

## 2019-04-01 ENCOUNTER — TRANSCRIBE ORDERS (OUTPATIENT)
Dept: ADMINISTRATIVE | Facility: HOSPITAL | Age: 84
End: 2019-04-01

## 2019-04-01 DIAGNOSIS — R42 DIZZINESS: Primary | ICD-10-CM

## 2019-04-01 DIAGNOSIS — Z12.39 BREAST SCREENING, UNSPECIFIED: ICD-10-CM

## 2019-04-09 ENCOUNTER — OFFICE VISIT (OUTPATIENT)
Dept: LAB | Age: 84
End: 2019-04-09
Payer: COMMERCIAL

## 2019-04-09 DIAGNOSIS — R42 DIZZINESS: ICD-10-CM

## 2019-04-09 LAB
ATRIAL RATE: 357 BPM
QRS AXIS: 0 DEGREES
QRSD INTERVAL: 86 MS
QT INTERVAL: 360 MS
QTC INTERVAL: 452 MS
T WAVE AXIS: 65 DEGREES
VENTRICULAR RATE: 95 BPM

## 2019-04-09 PROCEDURE — 93010 ELECTROCARDIOGRAM REPORT: CPT | Performed by: INTERNAL MEDICINE

## 2019-04-09 PROCEDURE — 93005 ELECTROCARDIOGRAM TRACING: CPT

## 2019-04-16 ENCOUNTER — HOSPITAL ENCOUNTER (OUTPATIENT)
Dept: RADIOLOGY | Age: 84
Discharge: HOME | End: 2019-04-16
Payer: COMMERCIAL

## 2019-04-16 VITALS — HEIGHT: 62 IN | WEIGHT: 156 LBS | BODY MASS INDEX: 28.71 KG/M2

## 2019-04-16 DIAGNOSIS — Z12.39 BREAST SCREENING, UNSPECIFIED: ICD-10-CM

## 2019-04-16 PROCEDURE — 77067 SCR MAMMO BI INCL CAD: CPT

## 2019-08-30 ENCOUNTER — HOSPITAL ENCOUNTER (EMERGENCY)
Facility: HOSPITAL | Age: 84
Discharge: HOME | End: 2019-08-30
Attending: EMERGENCY MEDICINE
Payer: COMMERCIAL

## 2019-08-30 ENCOUNTER — APPOINTMENT (EMERGENCY)
Dept: RADIOLOGY | Facility: HOSPITAL | Age: 84
End: 2019-08-30
Payer: COMMERCIAL

## 2019-08-30 VITALS
OXYGEN SATURATION: 95 % | RESPIRATION RATE: 18 BRPM | HEART RATE: 104 BPM | TEMPERATURE: 98 F | SYSTOLIC BLOOD PRESSURE: 207 MMHG | DIASTOLIC BLOOD PRESSURE: 104 MMHG

## 2019-08-30 DIAGNOSIS — M19.031 ARTHRITIS OF RIGHT WRIST: ICD-10-CM

## 2019-08-30 DIAGNOSIS — M25.531 ACUTE PAIN OF RIGHT WRIST: Primary | ICD-10-CM

## 2019-08-30 PROCEDURE — 73110 X-RAY EXAM OF WRIST: CPT

## 2019-08-30 PROCEDURE — 99283 EMERGENCY DEPT VISIT LOW MDM: CPT | Performed by: EMERGENCY MEDICINE

## 2019-08-30 PROCEDURE — 99284 EMERGENCY DEPT VISIT MOD MDM: CPT

## 2019-08-30 NOTE — ED PROVIDER NOTES
History  Chief Complaint   Patient presents with    Wrist Pain     PT arrives via EMS from assisted living with c/o R wrist pain that started 6 weeks ago (from wrist to elbow)  Elbow pain has since resolved, but this morning PT woke up with increased wrist pain, swelling, and some redness  PT denies injury, but, had broken same wrist 10 years ago  History provided by:  Patient  Wrist Pain   Location:  Right wrist  Quality:  Dull  Severity:  Moderate  Onset quality:  Gradual  Duration: Been occurring on off for 6 weeks, greatly worsened over the past 2 days prompting ED visit  Timing:  Constant  Progression:  Unchanged  Chronicity:  New  Context:  Atraumatic, fractured wrist 10 years ago was told that she would likely develop arthritis in it, feels it is arthritis but her friends wanted her to be evaluated for possible gout  Relieved by:  Holding still  Worsened by: Movement  Ineffective treatments:  Aspercreme, Tylenol,  Associated symptoms: no chest pain, no fever, no headaches, no rash and no shortness of breath        Prior to Admission Medications   Prescriptions Last Dose Informant Patient Reported? Taking?    Blood Pressure Monitoring (ADULT BLOOD PRESSURE CUFF LG) KIT   No No   Sig: by Does not apply route once for 1 dose   Multiple Vitamins-Minerals (MULTIVITAMIN ADULT PO)   Yes No   Sig: Take 1 tablet by mouth daily   Omega-3 Fatty Acids (FISH OIL) 1,000 mg   Yes No   Sig: Take 1,000 mg by mouth 2 (two) times a day   aspirin 81 mg chewable tablet   Yes No   Sig: Chew 81 mg daily   calcium carbonate (OS-ESTEBAN) 600 MG tablet   Yes No   Sig: Take 600 mg by mouth daily   carvedilol (COREG) 12 5 mg tablet   No No   Sig: Take 1 tablet by mouth 2 (two) times a day with meals for 30 days   cholecalciferol (VITAMIN D3) 1,000 units tablet   Yes No   Sig: Take 1,000 Units by mouth daily   losartan-hydrochlorothiazide (HYZAAR) 50-12 5 mg per tablet   No No   Sig: Take 1 tablet by mouth daily for 30 days omeprazole (PriLOSEC) 20 mg delayed release capsule   Yes No   Sig: Take 20 mg by mouth daily      Facility-Administered Medications: None       Past Medical History:   Diagnosis Date    Hyperlipidemia     Hypertension        Past Surgical History:   Procedure Laterality Date    BREAST CYST EXCISION Right 1984    benign    HYSTERECTOMY      age 44       History reviewed  No pertinent family history  I have reviewed and agree with the history as documented  Social History     Tobacco Use    Smoking status: Former Smoker     Types: Cigarettes    Smokeless tobacco: Never Used    Tobacco comment: quit 50 years ago   Substance Use Topics    Alcohol use: Yes     Comment: 1x a week    Drug use: No        Review of Systems   Constitutional: Negative for fever  Respiratory: Negative for chest tightness and shortness of breath  Cardiovascular: Negative for chest pain  Skin: Negative for rash  Neurological: Negative for dizziness, light-headedness and headaches  Physical Exam  Physical Exam   Constitutional: She appears well-developed  HENT:   Head: Atraumatic  Eyes: Conjunctivae are normal  Right eye exhibits no discharge  Left eye exhibits no discharge  No scleral icterus  Neck: Neck supple  No tracheal deviation present  Pulmonary/Chest: Effort normal  No stridor  No respiratory distress  Musculoskeletal: She exhibits no deformity  Mildly tender over distal wrist, mild soft tissue swelling no overlying erythema   Neurological: She is alert  She exhibits normal muscle tone  Coordination normal    Skin: Skin is warm and dry  No rash noted  No erythema  No pallor  Psychiatric: She has a normal mood and affect  Vitals reviewed        Vital Signs  ED Triage Vitals [08/30/19 1531]   Temperature Pulse Respirations Blood Pressure SpO2   98 °F (36 7 °C) 104 18 (!) 207/104 95 %      Temp Source Heart Rate Source Patient Position - Orthostatic VS BP Location FiO2 (%)   Oral Monitor Sitting Left arm --      Pain Score       --           Vitals:    08/30/19 1531   BP: (!) 207/104   Pulse: 104   Patient Position - Orthostatic VS: Sitting         Visual Acuity      ED Medications  Medications - No data to display    Diagnostic Studies  Results Reviewed     None                 XR wrist 3+ views RIGHT   ED Interpretation by Zara Mcallister DO (08/30 9268)   Degenerative changes, osteoarthritis, no acute fracture                 Procedures  Procedures       ED Course                               MDM  Number of Diagnoses or Management Options  Acute pain of right wrist: new and requires workup  Arthritis of right wrist: new and requires workup  Diagnosis management comments:       Initial ED assessment:  19-year-old female presents with wrist pain, right-sided mildly swollen on exam no injury history of fracture 10 years ago    Initial DDx includes but is not limited to:   Osteoarthritis, less likely gouty arthritis due to only mild discomfort with range of motion, would suspect more discomfort    Initial ED plan:   X-ray, offered pain controlling medication for which he respectfully declined        Final ED summary/disposition:   After evaluation and workup in the emergency department, x-ray concerning for arthritis, offered stronger pain medication patient respectfully declining, she just wanted reassurance of what it was    Strict return parameters she understands        Amount and/or Complexity of Data Reviewed  Tests in the radiology section of CPT®: ordered and reviewed  Review and summarize past medical records: yes  Independent visualization of images, tracings, or specimens: yes        Disposition  Final diagnoses:   Arthritis of right wrist   Acute pain of right wrist     Time reflects when diagnosis was documented in both MDM as applicable and the Disposition within this note     Time User Action Codes Description Comment    8/30/2019  4:26 PM Dawson Ferraro Add [W34 578] Arthritis of right wrist     8/30/2019  4:26 PM Wellfordell Saint Add [M25 531] Acute pain of right wrist     8/30/2019  4:26 PM Yunielell Saint Modify [D73 180] Arthritis of right wrist     8/30/2019  4:26 PM Wellfordell Saint Modify [H89 018] Acute pain of right wrist       ED Disposition     ED Disposition Condition Date/Time Comment    Discharge Stable Fri Aug 30, 2019  4:26 PM Adalgisa Carlson discharge to home/self care  Follow-up Information     Follow up With Specialties Details Why Contact Info Additional [address] [facility] Call today To arrange for the next available appointment [address] [facility] [phone]  [place] [address], [address] [place] [address], [phone]          Patient's Medications   Discharge Prescriptions    No medications on file     No discharge procedures on file      ED Provider  Electronically Signed by           Jenaro Matrini DO  08/30/19 DO Oralia  08/30/19 5329

## 2019-08-30 NOTE — ED NOTES
PT awake and alert, no distress noted  No other questions upon d/c       April Mayra Copeland RN  08/30/19 1485

## 2019-10-10 ENCOUNTER — TRANSCRIBE ORDERS (OUTPATIENT)
Dept: ADMINISTRATIVE | Facility: HOSPITAL | Age: 84
End: 2019-10-10

## 2019-10-10 DIAGNOSIS — I48.91 ATRIAL FIBRILLATION, UNSPECIFIED TYPE (HCC): Primary | ICD-10-CM

## 2019-11-19 ENCOUNTER — HOSPITAL ENCOUNTER (OUTPATIENT)
Dept: NON INVASIVE DIAGNOSTICS | Facility: CLINIC | Age: 84
Discharge: HOME | End: 2019-11-19
Payer: COMMERCIAL

## 2019-11-19 ENCOUNTER — TRANSCRIBE ORDERS (OUTPATIENT)
Dept: NON INVASIVE DIAGNOSTICS | Facility: CLINIC | Age: 84
End: 2019-11-19

## 2019-11-19 DIAGNOSIS — I48.91 ATRIAL FIBRILLATION, UNSPECIFIED TYPE (HCC): ICD-10-CM

## 2019-11-19 PROCEDURE — 93306 TTE W/DOPPLER COMPLETE: CPT | Performed by: INTERNAL MEDICINE

## 2019-11-19 PROCEDURE — 93306 TTE W/DOPPLER COMPLETE: CPT

## 2019-12-05 ENCOUNTER — OFFICE VISIT (OUTPATIENT)
Dept: CARDIOLOGY CLINIC | Facility: CLINIC | Age: 84
End: 2019-12-05
Payer: COMMERCIAL

## 2019-12-05 VITALS
SYSTOLIC BLOOD PRESSURE: 138 MMHG | BODY MASS INDEX: 28.89 KG/M2 | HEART RATE: 104 BPM | DIASTOLIC BLOOD PRESSURE: 80 MMHG | WEIGHT: 157 LBS | HEIGHT: 62 IN

## 2019-12-05 DIAGNOSIS — I10 ESSENTIAL HYPERTENSION: ICD-10-CM

## 2019-12-05 DIAGNOSIS — I48.21 PERMANENT ATRIAL FIBRILLATION (HCC): Primary | ICD-10-CM

## 2019-12-05 DIAGNOSIS — E78.5 HYPERLIPIDEMIA, UNSPECIFIED HYPERLIPIDEMIA TYPE: ICD-10-CM

## 2019-12-05 PROCEDURE — 99203 OFFICE O/P NEW LOW 30 MIN: CPT | Performed by: INTERNAL MEDICINE

## 2019-12-05 PROCEDURE — 93000 ELECTROCARDIOGRAM COMPLETE: CPT | Performed by: INTERNAL MEDICINE

## 2019-12-05 RX ORDER — RIVAROXABAN 20 MG/1
TABLET, FILM COATED ORAL
COMMUNITY
Start: 2019-11-12

## 2019-12-05 RX ORDER — SIMVASTATIN 20 MG
20 TABLET ORAL DAILY
COMMUNITY
Start: 2019-11-12

## 2019-12-05 RX ORDER — LORATADINE 10 MG/1
TABLET ORAL DAILY
COMMUNITY

## 2019-12-05 RX ORDER — CHLORAL HYDRATE 500 MG
CAPSULE ORAL
COMMUNITY
End: 2019-12-05 | Stop reason: SDUPTHER

## 2019-12-05 NOTE — PROGRESS NOTES
Cardiology Follow Up    Geovany Harris  11/21/1926  [id number]  [facility] [address]  [phone]  [phone]    1  Permanent atrial fibrillation  POCT ECG   2  Essential hypertension     3  Hyperlipidemia, unspecified hyperlipidemia type         Interval History:   Cardiology consultation  Most pleasant nonagenarian, younger appearing to was diagnosed with atrial fibrillation about 3 months ago  EKG from 2 April this year revealed atrial fibrillation  She was started on full anticoagulation factor Xa inhibitor about 3 months ago  No bleeding issues with anticoagulation  The patient's offers little complaints, she does complain of fatigue and tiredness, is uncertain with this is a new problem was is been there for quite some time  She is very active for age, denies any chest pain, mild dyspnea class 1 which is chronic, denies any syncope or presyncope  Denies any focal neurological deficits  Patient Active Problem List   Diagnosis    Hypertension    Hyperlipidemia    Vertigo    Hypertensive urgency    Prediabetes    Sinus pressure    Permanent atrial fibrillation     Past Medical History:   Diagnosis Date    Hyperlipidemia     Hypertension      Social History     Socioeconomic History    Marital status:      Spouse name: Not on file    Number of children: Not on file    Years of education: Not on file    Highest education level: Not on file   Occupational History    Not on file   Social Needs    Financial resource strain: Not on file    Food insecurity:     Worry: Not on file     Inability: Not on file    Transportation needs:     Medical: Not on file     Non-medical: Not on file   Tobacco Use    Smoking status: Former Smoker     Types: Cigarettes    Smokeless tobacco: Never Used    Tobacco comment: quit 50 years ago   Substance and Sexual Activity    Alcohol use:  Yes Comment: 1x a week    Drug use: No    Sexual activity: Not on file   Lifestyle    Physical activity:     Days per week: Not on file     Minutes per session: Not on file    Stress: Not on file   Relationships    Social connections:     Talks on phone: Not on file     Gets together: Not on file     Attends Jewish service: Not on file     Active member of club or organization: Not on file     Attends meetings of clubs or organizations: Not on file     Relationship status: Not on file    Intimate partner violence:     Fear of current or ex partner: Not on file     Emotionally abused: Not on file     Physically abused: Not on file     Forced sexual activity: Not on file   Other Topics Concern    Not on file   Social History Narrative    Not on file      No family history on file    Past Surgical History:   Procedure Laterality Date    BREAST CYST EXCISION Right 1984    benign    HYSTERECTOMY      age 44       Current Outpatient Medications:     aspirin 81 mg chewable tablet, Chew 81 mg daily, Disp: , Rfl:     Blood Pressure Monitoring (ADULT BLOOD PRESSURE CUFF LG) KIT, by Does not apply route once for 1 dose, Disp: 1 each, Rfl: 0    calcium carbonate (OS-ESTEBAN) 600 MG tablet, Take 600 mg by mouth daily, Disp: , Rfl:     carvedilol (COREG) 12 5 mg tablet, Take 1 tablet by mouth 2 (two) times a day with meals for 30 days, Disp: 60 tablet, Rfl: 0    cholecalciferol (VITAMIN D3) 1,000 units tablet, Take 1,000 Units by mouth daily, Disp: , Rfl:     losartan-hydrochlorothiazide (HYZAAR) 50-12 5 mg per tablet, Take 1 tablet by mouth daily for 30 days, Disp: 30 tablet, Rfl: 0    Multiple Vitamins-Minerals (MULTIVITAMIN ADULT PO), Take 1 tablet by mouth daily, Disp: , Rfl:     Omega-3 Fatty Acids (FISH OIL) 1,000 mg, Take 1,000 mg by mouth 2 (two) times a day, Disp: , Rfl:     Omega-3 Fatty Acids (FISH OIL) 1,000 mg, Fish Oil  1000 MG 1 TAB DAILY, Disp: , Rfl:     omeprazole (PriLOSEC) 20 mg delayed release capsule, Take 20 mg by mouth daily, Disp: , Rfl:   No Known Allergies    Labs:  No visits with results within 6 Month(s) from this visit  Latest known visit with results is:   Office Visit on 04/09/2019   Component Date Value    Ventricular Rate 04/09/2019 95     Atrial Rate 04/09/2019 357     QRSD Interval 04/09/2019 86     QT Interval 04/09/2019 360     QTC Interval 04/09/2019 452     QRS Axis 04/09/2019 0     T Wave Axis 04/09/2019 65      Imaging: No results found  Review of Systems:  Review of Systems   Constitutional: Positive for fatigue  HENT: Negative for nosebleeds  Eyes: Negative for visual disturbance  Respiratory: Negative for apnea, shortness of breath, wheezing and stridor  Cardiovascular: Negative for chest pain, palpitations and leg swelling  Gastrointestinal: Negative for abdominal pain and blood in stool  Endocrine: Negative for cold intolerance and heat intolerance  Genitourinary: Negative for difficulty urinating and hematuria  Musculoskeletal: Positive for arthralgias and gait problem  Skin: Negative for pallor and rash  Allergic/Immunologic: Negative for immunocompromised state  Neurological: Negative for dizziness, tremors, seizures, syncope, facial asymmetry, speech difficulty, weakness, light-headedness, numbness and headaches  Hematological: Does not bruise/bleed easily  Psychiatric/Behavioral: Negative for sleep disturbance  The patient is not nervous/anxious  Physical Exam:  Physical Exam   Constitutional: She appears well-developed  No distress  Eyes: Conjunctivae are normal  No scleral icterus  Neck: No JVD present  Cardiovascular: Normal rate, normal heart sounds and intact distal pulses  Exam reveals no gallop and no friction rub  No murmur heard  Pulmonary/Chest: Effort normal and breath sounds normal  No stridor  No respiratory distress  She has no wheezes  She has no rales  Abdominal: Soft   Bowel sounds are normal  Musculoskeletal: She exhibits no edema  Neurological: She is alert  Skin: Skin is warm and dry  Capillary refill takes less than 2 seconds  No rash noted  She is not diaphoretic  No erythema  No pallor  Psychiatric: She has a normal mood and affect  Her behavior is normal  Judgment and thought content normal    Vitals reviewed  Discussion/Summary:  Atrial fibrillation  Chronic, EKG from 04/19 revealed atrial fibrillation at that time, previous EKG from 2017 revealed normal sinus rhythm  Previously not on anticoagulation, she does have a mild to moderate cardioembolic risk  Recent echocardiogram revealed normal left systolic function with left hypertrophy there was mild-to-moderate mitral insufficiency and mild left atrial enlargement  Mild tricuspid insufficiency with estimated normal pulmonary artery pressures suggested by Doppler criteria  My preference will be to continue with full anticoagulation with factor Xa inhibitor, adjusted for GFR, most recent creatinine of 1 4 GFR in the 30s  Will do a Holter monitor to assess rate control  She is somewhat tachycardic today  The fatigue is probably multifactorial, age related and perhaps to some degree related to her atrial fibrillation but favor just rate control anticoagulation  This note was completed in part utilizing [employer] direct voice recognition software  Grammatical errors, random word insertion, spelling mistakes, and incomplete sentences may be an occasional consequence of the system secondary to software limitations, ambient noise and hardware issues  At the time of dictation, efforts were made to edit, clarify and /or correct errors  Please read the chart carefully and recognize, using context, where substitutions have occurred  If you have any questions or concerns about the context, text or information contained within the body of this dictation, please contact myself, the provider, for further clarification

## 2019-12-10 ENCOUNTER — PROCEDURE VISIT (OUTPATIENT)
Dept: CARDIOLOGY CLINIC | Facility: CLINIC | Age: 84
End: 2019-12-10

## 2019-12-10 DIAGNOSIS — I48.21 PERMANENT ATRIAL FIBRILLATION (HCC): Primary | ICD-10-CM

## 2019-12-10 PROCEDURE — RECHECK: Performed by: INTERNAL MEDICINE

## 2019-12-12 ENCOUNTER — HOSPITAL ENCOUNTER (OUTPATIENT)
Dept: NON INVASIVE DIAGNOSTICS | Facility: HOSPITAL | Age: 84
Discharge: HOME | End: 2019-12-12
Payer: COMMERCIAL

## 2019-12-12 DIAGNOSIS — I48.21 PERMANENT ATRIAL FIBRILLATION (HCC): ICD-10-CM

## 2019-12-12 PROCEDURE — 93225 XTRNL ECG REC<48 HRS REC: CPT

## 2019-12-12 PROCEDURE — 93226 XTRNL ECG REC<48 HR SCAN A/R: CPT

## 2019-12-13 PROCEDURE — 93227 XTRNL ECG REC<48 HR R&I: CPT | Performed by: INTERNAL MEDICINE

## 2019-12-18 ENCOUNTER — TELEPHONE (OUTPATIENT)
Dept: CARDIOLOGY CLINIC | Facility: CLINIC | Age: 84
End: 2019-12-18

## 2020-07-29 ENCOUNTER — TELEPHONE (OUTPATIENT)
Dept: CARDIOLOGY CLINIC | Facility: CLINIC | Age: 85
End: 2020-07-29

## 2020-07-29 NOTE — TELEPHONE ENCOUNTER
Pt called with concerned HR consistently >100 since stopping Carvedilol   asking to be seen   Scheduled with NP on Thursday

## 2020-07-30 ENCOUNTER — OFFICE VISIT (OUTPATIENT)
Dept: CARDIOLOGY CLINIC | Facility: CLINIC | Age: 85
End: 2020-07-30
Payer: COMMERCIAL

## 2020-07-30 VITALS
TEMPERATURE: 97.1 F | BODY MASS INDEX: 28.12 KG/M2 | DIASTOLIC BLOOD PRESSURE: 80 MMHG | SYSTOLIC BLOOD PRESSURE: 136 MMHG | WEIGHT: 152.8 LBS | HEIGHT: 62 IN | OXYGEN SATURATION: 96 % | HEART RATE: 104 BPM

## 2020-07-30 DIAGNOSIS — I48.21 PERMANENT ATRIAL FIBRILLATION (HCC): Primary | ICD-10-CM

## 2020-07-30 DIAGNOSIS — E78.5 HYPERLIPIDEMIA, UNSPECIFIED HYPERLIPIDEMIA TYPE: ICD-10-CM

## 2020-07-30 DIAGNOSIS — I10 HYPERTENSION, UNSPECIFIED TYPE: ICD-10-CM

## 2020-07-30 PROCEDURE — 93000 ELECTROCARDIOGRAM COMPLETE: CPT | Performed by: NURSE PRACTITIONER

## 2020-07-30 PROCEDURE — 99214 OFFICE O/P EST MOD 30 MIN: CPT | Performed by: NURSE PRACTITIONER

## 2020-07-30 RX ORDER — CARVEDILOL 3.12 MG/1
3.12 TABLET ORAL 2 TIMES DAILY WITH MEALS
Qty: 120 TABLET | Refills: 3 | Status: SHIPPED | OUTPATIENT
Start: 2020-07-30 | End: 2020-08-07 | Stop reason: SDUPTHER

## 2020-07-30 NOTE — PROGRESS NOTES
Cardiology Follow Up    Khadijah Young  11/21/1926  [id number]  [facility] [address] [id number] [phone]    Office visit     Interval History:   Ms Khadijah Young Presents to our office with concerns of high heart rates and previously low blood pressure  Her primary care  Provider took her off Hyzaar  Her blood pressures at home have been running 120/69 to 149/100 since she was taken off Hyzaar  Heart rates are running 105 beats per minute to 110 beats per minute  Byron Baez denies chest pain, palpitations,  Lightheadedness, dizziness dyspnea with minimal or moderate exertion  She admits occasional lower extremity edema  Byron Baez chief complaint is fatigue  She admits to snoring at night but sleeps well  She complains of occasional numbness in her feet  HPI:  Atrial fibrillation on Xarelto 20mg daily for stroke prevention   HgbA1C 6 8 done on 9/22/17  Hyperlipidemia  Hypertension  11/19/19 TTE LVEF 65%, wall thickness mildly increased, LA mildly dilated, mild to mod MR, mild TR  12/12/19 24 hour Holter Monitor showed Fibrillation   Average heart rate 104 beats per minute, minimum heart rate 70 beats per minute, maximum heart rate 150 beats per minute average hourly heart rate  beats per minute  Rare ventricular ectopy, no symptoms reported  Patient Active Problem List   Diagnosis    Hypertension    Hyperlipidemia    Vertigo    Hypertensive urgency    Prediabetes    Sinus pressure    Permanent atrial fibrillation [facility])     Past Medical History:   Diagnosis Date    Hyperlipidemia     Hypertension      Social History     Socioeconomic History    Marital status:       Spouse name: Not on file    Number of children: Not on file    Years of education: Not on file    Highest education level: Not on file   Occupational History    Not on file   Social Needs    Financial resource strain: Not on file    Food insecurity:     Worry: Not on file     Inability: Not on file    Transportation needs:     Medical: Not on file     Non-medical: Not on file   Tobacco Use    Smoking status: Former Smoker     Types: Cigarettes    Smokeless tobacco: Never Used    Tobacco comment: quit 50 years ago   Substance and Sexual Activity    Alcohol use: Yes     Comment: 1x a week    Drug use: No    Sexual activity: Not on file   Lifestyle    Physical activity:     Days per week: Not on file     Minutes per session: Not on file    Stress: Not on file   Relationships    Social connections:     Talks on phone: Not on file     Gets together: Not on file     Attends Oriental orthodox service: Not on file     Active member of club or organization: Not on file     Attends meetings of clubs or organizations: Not on file     Relationship status: Not on file    Intimate partner violence:     Fear of current or ex partner: Not on file     Emotionally abused: Not on file     Physically abused: Not on file     Forced sexual activity: Not on file   Other Topics Concern    Not on file   Social History Narrative    Not on file      No family history on file    Past Surgical History:   Procedure Laterality Date    BREAST CYST EXCISION Right 1984    benign    HYSTERECTOMY      age 44       Current Outpatient Medications:     aspirin 81 mg chewable tablet, Chew 81 mg daily, Disp: , Rfl:     Blood Pressure Monitoring (ADULT BLOOD PRESSURE CUFF LG) KIT, by Does not apply route once for 1 dose, Disp: 1 each, Rfl: 0    calcium carbonate (OS-ESTEBAN) 600 MG tablet, Take 600 mg by mouth daily, Disp: , Rfl:     carvedilol (COREG) 12 5 mg tablet, Take 1 tablet by mouth 2 (two) times a day with meals for 30 days (Patient taking differently: Take 6 25 mg by mouth 2 (two) times a day with meals ), Disp: 60 tablet, Rfl: 0    cholecalciferol (VITAMIN D3) 1,000 units tablet, Take 1,000 Units by mouth daily, Disp: , Rfl:    loratadine (CLARITIN) 10 mg tablet, Daily, Disp: , Rfl:     losartan-hydrochlorothiazide (HYZAAR) 50-12 5 mg per tablet, Take 1 tablet by mouth daily for 30 days, Disp: 30 tablet, Rfl: 0    Multiple Vitamins-Minerals (MULTIVITAMIN ADULT PO), Take 1 tablet by mouth daily, Disp: , Rfl:     Omega-3 Fatty Acids (FISH OIL) 1,000 mg, Take 1,000 mg by mouth 2 (two) times a day, Disp: , Rfl:     omeprazole (PriLOSEC) 20 mg delayed release capsule, Take 20 mg by mouth daily, Disp: , Rfl:     Psyllium (METAMUCIL PO), Take by mouth daily, Disp: , Rfl:     simvastatin (ZOCOR) 20 mg tablet, 20 mg daily , Disp: , Rfl:     XARELTO 20 MG tablet, daily with breakfast , Disp: , Rfl:   No Known Allergies    Labs:  No visits with results within 2 Month(s) from this visit  Latest known visit with results is:   Office Visit on 04/09/2019   Component Date Value    Ventricular Rate 04/09/2019 95     Atrial Rate 04/09/2019 357     QRSD Interval 04/09/2019 86     QT Interval 04/09/2019 360     QTC Interval 04/09/2019 452     QRS Axis 04/09/2019 0     T Wave Axis 04/09/2019 65      Imaging: No results found  Review of Systems:  Review of Systems   Constitutional: Positive for fatigue  Musculoskeletal: Positive for arthralgias  All other systems reviewed and are negative  Physical Exam:  Physical Exam   Constitutional: She is oriented to person, place, and time  She appears well-developed and well-nourished  HENT:   Head: Normocephalic  Eyes: Pupils are equal, round, and reactive to light  Neck: Normal range of motion  Cardiovascular:   Irregular rate and rhythm, no murmur noted    Pulmonary/Chest: Effort normal and breath sounds normal    Abdominal: Soft  Bowel sounds are normal    Musculoskeletal: Normal range of motion  She exhibits no edema  Neurological: She is alert and oriented to person, place, and time  Skin: Skin is warm and dry  Capillary refill takes less than 2 seconds     Psychiatric: She has a normal mood and affect  Vitals reviewed  Discussion/Summary:  1  Persistent Atrial Fibrillation  EKG in office confirms atrial fibrillation with  BPM   Continue on Xarelto 20mg daily for stroke prevention  Rate poorly controlled  104-110 BPM with home monitor  Continue on Coreg 6 25mg BID, add Coreg 3 125mg BID to equal Coreg 8 375mg BID for improved heart rate control  Ms Prema Abrams admits to snoring at night and daytime fatigue  She is refusing Sleep study to R/O sleep apnea  2  Hypertension- Home BP running 120/69 to 149/100, continue off Hyzaar, increase coreg to 8 375mg BID with meals    Continue home BP monitoring,  Call our office next week with BP and HR log    3  Hyperlipidemia- continue on Zocor 20mg daily

## 2020-07-30 NOTE — PATIENT INSTRUCTIONS
2gm sodium low fat low cholesterol diet, eating fresh is best      Additional Coreg 3 125mg BID with meals to Coreg 6 25mg BID = 8 375ng BID    Call our office next week with BP and HR log

## 2020-08-07 DIAGNOSIS — I48.21 PERMANENT ATRIAL FIBRILLATION (HCC): ICD-10-CM

## 2020-08-07 RX ORDER — CARVEDILOL 3.12 MG/1
3.12 TABLET ORAL 2 TIMES DAILY WITH MEALS
Qty: 180 TABLET | Refills: 3 | Status: ON HOLD | OUTPATIENT
Start: 2020-08-07 | End: 2021-03-22 | Stop reason: SDUPTHER

## 2020-08-21 ENCOUNTER — TELEPHONE (OUTPATIENT)
Dept: CARDIOLOGY CLINIC | Facility: CLINIC | Age: 85
End: 2020-08-21

## 2020-08-21 NOTE — TELEPHONE ENCOUNTER
Fran Mcconnell, Patient called stating she sees no change in her BP, or HR since med change  Systolic [phone]  Please advise

## 2020-08-25 NOTE — TELEPHONE ENCOUNTER
Blood pressure improved control,  HR slightly elevated  Continue to monitor  Follow up with Dr Jessica Eaton

## 2020-08-26 ENCOUNTER — TELEPHONE (OUTPATIENT)
Dept: CARDIOLOGY CLINIC | Facility: CLINIC | Age: 85
End: 2020-08-26

## 2021-01-07 ENCOUNTER — OFFICE VISIT (OUTPATIENT)
Dept: CARDIOLOGY CLINIC | Facility: CLINIC | Age: 86
End: 2021-01-07
Payer: COMMERCIAL

## 2021-01-07 VITALS
BODY MASS INDEX: 28.61 KG/M2 | DIASTOLIC BLOOD PRESSURE: 80 MMHG | HEART RATE: 98 BPM | SYSTOLIC BLOOD PRESSURE: 148 MMHG | OXYGEN SATURATION: 97 % | WEIGHT: 155.5 LBS | HEIGHT: 62 IN

## 2021-01-07 DIAGNOSIS — I48.21 PERMANENT ATRIAL FIBRILLATION (HCC): ICD-10-CM

## 2021-01-07 DIAGNOSIS — I10 ESSENTIAL HYPERTENSION: Primary | ICD-10-CM

## 2021-01-07 DIAGNOSIS — E78.5 HYPERLIPIDEMIA, UNSPECIFIED HYPERLIPIDEMIA TYPE: ICD-10-CM

## 2021-01-07 PROCEDURE — 93000 ELECTROCARDIOGRAM COMPLETE: CPT | Performed by: INTERNAL MEDICINE

## 2021-01-07 PROCEDURE — 99213 OFFICE O/P EST LOW 20 MIN: CPT | Performed by: INTERNAL MEDICINE

## 2021-01-07 NOTE — PROGRESS NOTES
Cardiology Follow Up    Aziza Timmons  11/21/1926  [id number]  [facility] [address]  [phone]    1  Essential hypertension     2  Permanent atrial fibrillation ([address] )     3  Hyperlipidemia, unspecified hyperlipidemia type         Interval History:  Cardiology follow-up  Patient is clinically stable, since I saw her last, her blood pressure ARB medication was discontinue because of borderline low blood pressures, heart rates have been noticed to be somewhat elevated  She is on low-dose carvedilol  No significant bleeding issues on full anticoagulation with factor Xa inhibitor  Denies any focal neurological deficits denies any amaurosis fugax  No syncope or presyncope  Denies any chest pain or significant dyspnea although she is almost dyspneic now than a year before  Brianne Weems He still very active for age  Patient Active Problem List   Diagnosis    Hypertension    Hyperlipidemia    Vertigo    Hypertensive urgency    Prediabetes    Sinus pressure    Permanent atrial fibrillation [facility])     Past Medical History:   Diagnosis Date    Hyperlipidemia     Hypertension      Social History     Socioeconomic History    Marital status:       Spouse name: Not on file    Number of children: Not on file    Years of education: Not on file    Highest education level: Not on file   Occupational History    Not on file   Social Needs    Financial resource strain: Not on file    Food insecurity     Worry: Not on file     Inability: Not on file    Transportation needs     Medical: Not on file     Non-medical: Not on file   Tobacco Use    Smoking status: Former Smoker     Types: Cigarettes    Smokeless tobacco: Never Used    Tobacco comment: quit 50 years ago   Substance and Sexual Activity    Alcohol use: Yes     Comment: 1x a week    Drug use: No    Sexual activity: Not on file Lifestyle    Physical activity     Days per week: Not on file     Minutes per session: Not on file    Stress: Not on file   Relationships    Social connections     Talks on phone: Not on file     Gets together: Not on file     Attends Confucianist service: Not on file     Active member of club or organization: Not on file     Attends meetings of clubs or organizations: Not on file     Relationship status: Not on file    Intimate partner violence     Fear of current or ex partner: Not on file     Emotionally abused: Not on file     Physically abused: Not on file     Forced sexual activity: Not on file   Other Topics Concern    Not on file   Social History Narrative    Not on file      No family history on file    Past Surgical History:   Procedure Laterality Date    BREAST CYST EXCISION Right 1984    benign    HYSTERECTOMY      age 44       Current Outpatient Medications:     calcium carbonate (OS-ESTEBAN) 600 MG tablet, Take 600 mg by mouth daily, Disp: , Rfl:     carvedilol (COREG) 12 5 mg tablet, Take 1 tablet by mouth 2 (two) times a day with meals for 30 days (Patient taking differently: Take 6 25 mg by mouth 2 (two) times a day with meals ), Disp: 60 tablet, Rfl: 0    carvedilol (COREG) 3 125 mg tablet, Take 1 tablet (3 125 mg total) by mouth 2 (two) times a day with meals Coreg 3 125mg BID in addition to Coreg 6 25mg BID, Disp: 180 tablet, Rfl: 3    cholecalciferol (VITAMIN D3) 1,000 units tablet, Take 1,000 Units by mouth daily, Disp: , Rfl:     loratadine (CLARITIN) 10 mg tablet, Daily, Disp: , Rfl:     Multiple Vitamins-Minerals (MULTIVITAMIN ADULT PO), Take 1 tablet by mouth daily, Disp: , Rfl:     simvastatin (ZOCOR) 20 mg tablet, 20 mg daily , Disp: , Rfl:     XARELTO 20 MG tablet, daily with breakfast , Disp: , Rfl:     aspirin 81 mg chewable tablet, Chew 81 mg daily, Disp: , Rfl:     Blood Pressure Monitoring (ADULT BLOOD PRESSURE CUFF LG) KIT, by Does not apply route once for 1 dose, Disp: 1 each, Rfl: 0    Psyllium (METAMUCIL PO), Take by mouth daily, Disp: , Rfl:   No Known Allergies    Labs:  No visits with results within 6 Month(s) from this visit  Latest known visit with results is:   Office Visit on 04/09/2019   Component Date Value    Ventricular Rate 04/09/2019 95     Atrial Rate 04/09/2019 357     QRSD Interval 04/09/2019 86     QT Interval 04/09/2019 360     QTC Interval 04/09/2019 452     QRS Axis 04/09/2019 0     T Wave Axis 04/09/2019 65      Imaging: No results found  Review of Systems:  Review of Systems   Constitutional: Negative for fatigue  HENT: Positive for nosebleeds  Eyes: Negative for visual disturbance  Respiratory: Negative for apnea, shortness of breath, wheezing and stridor  Cardiovascular: Negative for chest pain, palpitations and leg swelling  Gastrointestinal: Negative for anal bleeding, blood in stool and constipation  Genitourinary: Negative for hematuria  Musculoskeletal: Negative for gait problem and myalgias  Neurological: Negative for dizziness, facial asymmetry, speech difficulty, weakness and light-headedness  Hematological: Bruises/bleeds easily  Psychiatric/Behavioral: Negative for sleep disturbance  The patient is not nervous/anxious  Physical Exam:  Physical Exam  Vitals signs reviewed  Constitutional:       General: She is not in acute distress  Appearance: Normal appearance  She is not ill-appearing, toxic-appearing or diaphoretic  Eyes:      General: No scleral icterus  Neck:      Vascular: No carotid bruit  Cardiovascular:      Rate and Rhythm: Normal rate  Rhythm irregular  Pulses: Normal pulses  Heart sounds: Normal heart sounds  No murmur  No friction rub  No gallop  Pulmonary:      Effort: Pulmonary effort is normal  No respiratory distress  Breath sounds: Normal breath sounds  No stridor  No wheezing, rhonchi or rales  Skin:     General: Skin is warm and dry        Capillary Refill: Capillary refill takes less than 2 seconds  Neurological:      General: No focal deficit present  Mental Status: She is alert  Psychiatric:         Mood and Affect: Mood normal          Discussion/Summary:  Chronic atrial fibrillation, on rate control plus anticoagulation with factor Xa inhibitor  Echocardiogram last year revealed normal left systolic function with left hypertrophy there was mild-to-moderate mitral insufficiency  Mild tricuspid insufficiency with normal pulmonary pressures suggested by Doppler criteria  Holter monitor 2 years ago revealed controlled atrial fibrillation with few episode of rapid heart rates  This note was completed in part utilizing [employer] direct voice recognition software  Grammatical errors, random word insertion, spelling mistakes, and incomplete sentences may be an occasional consequence of the system secondary to software limitations, ambient noise and hardware issues  At the time of dictation, efforts were made to edit, clarify and /or correct errors  Please read the chart carefully and recognize, using context, where substitutions have occurred  If you have any questions or concerns about the context, text or information contained within the body of this dictation, please contact myself, the provider, for further clarification

## 2021-01-14 ENCOUNTER — HOSPITAL ENCOUNTER (OUTPATIENT)
Dept: NON INVASIVE DIAGNOSTICS | Facility: CLINIC | Age: 86
Discharge: HOME | End: 2021-01-14
Payer: COMMERCIAL

## 2021-01-14 DIAGNOSIS — I48.21 PERMANENT ATRIAL FIBRILLATION (HCC): ICD-10-CM

## 2021-01-14 PROCEDURE — 93226 XTRNL ECG REC<48 HR SCAN A/R: CPT

## 2021-01-14 PROCEDURE — 93225 XTRNL ECG REC<48 HRS REC: CPT

## 2021-01-15 PROCEDURE — 93227 XTRNL ECG REC<48 HR R&I: CPT | Performed by: INTERNAL MEDICINE

## 2021-01-19 ENCOUNTER — TRANSCRIBE ORDERS (OUTPATIENT)
Dept: ADMINISTRATIVE | Facility: HOSPITAL | Age: 86
End: 2021-01-19

## 2021-01-19 DIAGNOSIS — R59.9 ENLARGED LYMPH NODE: Primary | ICD-10-CM

## 2021-01-26 ENCOUNTER — HOSPITAL ENCOUNTER (OUTPATIENT)
Dept: RADIOLOGY | Age: 86
Discharge: HOME | End: 2021-01-26
Payer: COMMERCIAL

## 2021-01-26 DIAGNOSIS — R59.9 ENLARGED LYMPH NODE: ICD-10-CM

## 2021-01-26 PROCEDURE — 76536 US EXAM OF HEAD AND NECK: CPT

## 2021-03-20 ENCOUNTER — HOSPITAL ENCOUNTER (INPATIENT)
Facility: HOSPITAL | Age: 86
LOS: 2 days | Discharge: HOME | DRG: 178 | End: 2021-03-22
Attending: EMERGENCY MEDICINE | Admitting: INTERNAL MEDICINE
Payer: COMMERCIAL

## 2021-03-20 ENCOUNTER — OFFICE VISIT (OUTPATIENT)
Dept: URGENT CARE | Age: 86
End: 2021-03-20
Payer: COMMERCIAL

## 2021-03-20 ENCOUNTER — APPOINTMENT (EMERGENCY)
Dept: RADIOLOGY | Facility: HOSPITAL | Age: 86
DRG: 178 | End: 2021-03-20
Payer: COMMERCIAL

## 2021-03-20 VITALS
DIASTOLIC BLOOD PRESSURE: 80 MMHG | TEMPERATURE: 97.8 F | HEART RATE: 125 BPM | OXYGEN SATURATION: 96 % | RESPIRATION RATE: 20 BRPM | HEIGHT: 61 IN | WEIGHT: 155 LBS | BODY MASS INDEX: 29.27 KG/M2 | SYSTOLIC BLOOD PRESSURE: 142 MMHG

## 2021-03-20 DIAGNOSIS — R42 POSTURAL DIZZINESS WITH PRESYNCOPE: ICD-10-CM

## 2021-03-20 DIAGNOSIS — R53.1 GENERALIZED WEAKNESS: ICD-10-CM

## 2021-03-20 DIAGNOSIS — R05.9 COUGH: ICD-10-CM

## 2021-03-20 DIAGNOSIS — R00.0 TACHYCARDIA: ICD-10-CM

## 2021-03-20 DIAGNOSIS — I48.21 PERMANENT ATRIAL FIBRILLATION (HCC): ICD-10-CM

## 2021-03-20 DIAGNOSIS — R55 POSTURAL DIZZINESS WITH PRESYNCOPE: ICD-10-CM

## 2021-03-20 DIAGNOSIS — R53.83 FATIGUE, UNSPECIFIED TYPE: Primary | ICD-10-CM

## 2021-03-20 DIAGNOSIS — U07.1 COVID-19: Primary | ICD-10-CM

## 2021-03-20 PROBLEM — R19.7 DIARRHEA: Status: ACTIVE | Noted: 2021-03-20

## 2021-03-20 PROBLEM — E87.1 HYPONATREMIA: Status: ACTIVE | Noted: 2021-03-20

## 2021-03-20 LAB
ALBUMIN SERPL BCP-MCNC: 3.3 G/DL (ref 3.5–5)
ALP SERPL-CCNC: 64 U/L (ref 46–116)
ALT SERPL W P-5'-P-CCNC: 34 U/L (ref 12–78)
ANION GAP SERPL CALCULATED.3IONS-SCNC: 8 MMOL/L (ref 4–13)
AST SERPL W P-5'-P-CCNC: 42 U/L (ref 5–45)
BASOPHILS # BLD AUTO: 0.02 THOUSANDS/ΜL (ref 0–0.1)
BASOPHILS NFR BLD AUTO: 0 % (ref 0–1)
BILIRUB SERPL-MCNC: 0.41 MG/DL (ref 0.2–1)
BUN SERPL-MCNC: 20 MG/DL (ref 5–25)
CALCIUM ALBUM COR SERPL-MCNC: 9.7 MG/DL (ref 8.3–10.1)
CALCIUM SERPL-MCNC: 9.1 MG/DL (ref 8.3–10.1)
CHLORIDE SERPL-SCNC: 95 MMOL/L (ref 100–108)
CK MB SERPL-MCNC: 1.3 NG/ML (ref 0–5)
CK MB SERPL-MCNC: <1 % (ref 0–2.5)
CK SERPL-CCNC: 140 U/L (ref 26–192)
CO2 SERPL-SCNC: 28 MMOL/L (ref 21–32)
CREAT SERPL-MCNC: 1.39 MG/DL (ref 0.6–1.3)
CRP SERPL QL: 41.5 MG/L
EOSINOPHIL # BLD AUTO: 0.01 THOUSAND/ΜL (ref 0–0.61)
EOSINOPHIL NFR BLD AUTO: 0 % (ref 0–6)
ERYTHROCYTE [DISTWIDTH] IN BLOOD BY AUTOMATED COUNT: 12.3 % (ref 11.6–15.1)
FLUAV RNA RESP QL NAA+PROBE: NEGATIVE
FLUBV RNA RESP QL NAA+PROBE: NEGATIVE
GFR SERPL CREATININE-BSD FRML MDRD: 32 ML/MIN/1.73SQ M
GLUCOSE SERPL-MCNC: 114 MG/DL (ref 65–140)
HCT VFR BLD AUTO: 42.8 % (ref 34.8–46.1)
HGB BLD-MCNC: 14.2 G/DL (ref 11.5–15.4)
IMM GRANULOCYTES # BLD AUTO: 0.04 THOUSAND/UL (ref 0–0.2)
IMM GRANULOCYTES NFR BLD AUTO: 1 % (ref 0–2)
LYMPHOCYTES # BLD AUTO: 1.34 THOUSANDS/ΜL (ref 0.6–4.47)
LYMPHOCYTES NFR BLD AUTO: 25 % (ref 14–44)
MCH RBC QN AUTO: 29.6 PG (ref 26.8–34.3)
MCHC RBC AUTO-ENTMCNC: 33.2 G/DL (ref 31.4–37.4)
MCV RBC AUTO: 89 FL (ref 82–98)
MONOCYTES # BLD AUTO: 0.51 THOUSAND/ΜL (ref 0.17–1.22)
MONOCYTES NFR BLD AUTO: 10 % (ref 4–12)
NEUTROPHILS # BLD AUTO: 3.47 THOUSANDS/ΜL (ref 1.85–7.62)
NEUTS SEG NFR BLD AUTO: 64 % (ref 43–75)
NRBC BLD AUTO-RTO: 0 /100 WBCS
NT-PROBNP SERPL-MCNC: 978 PG/ML
OSMOLALITY UR/SERPL-RTO: 275 MMOL/KG (ref 282–298)
OSMOLALITY UR: 324 MMOL/KG
PLATELET # BLD AUTO: 159 THOUSANDS/UL (ref 149–390)
PMV BLD AUTO: 9.9 FL (ref 8.9–12.7)
POTASSIUM SERPL-SCNC: 4.5 MMOL/L (ref 3.5–5.3)
PROT SERPL-MCNC: 8 G/DL (ref 6.4–8.2)
RBC # BLD AUTO: 4.79 MILLION/UL (ref 3.81–5.12)
RSV RNA RESP QL NAA+PROBE: NEGATIVE
SARS-COV-2 RNA RESP QL NAA+PROBE: POSITIVE
SODIUM SERPL-SCNC: 131 MMOL/L (ref 136–145)
TROPONIN I SERPL-MCNC: <0.02 NG/ML
TSH SERPL DL<=0.05 MIU/L-ACNC: 4.06 UIU/ML (ref 0.36–3.74)
WBC # BLD AUTO: 5.39 THOUSAND/UL (ref 4.31–10.16)

## 2021-03-20 PROCEDURE — 99223 1ST HOSP IP/OBS HIGH 75: CPT | Performed by: INTERNAL MEDICINE

## 2021-03-20 PROCEDURE — S9088 SERVICES PROVIDED IN URGENT: HCPCS | Performed by: PREVENTIVE MEDICINE

## 2021-03-20 PROCEDURE — XW033E5 INTRODUCTION OF REMDESIVIR ANTI-INFECTIVE INTO PERIPHERAL VEIN, PERCUTANEOUS APPROACH, NEW TECHNOLOGY GROUP 5: ICD-10-PCS | Performed by: INTERNAL MEDICINE

## 2021-03-20 PROCEDURE — 82550 ASSAY OF CK (CPK): CPT | Performed by: EMERGENCY MEDICINE

## 2021-03-20 PROCEDURE — 99285 EMERGENCY DEPT VISIT HI MDM: CPT | Performed by: EMERGENCY MEDICINE

## 2021-03-20 PROCEDURE — 85025 COMPLETE CBC W/AUTO DIFF WBC: CPT | Performed by: EMERGENCY MEDICINE

## 2021-03-20 PROCEDURE — 83880 ASSAY OF NATRIURETIC PEPTIDE: CPT | Performed by: EMERGENCY MEDICINE

## 2021-03-20 PROCEDURE — 83935 ASSAY OF URINE OSMOLALITY: CPT | Performed by: INTERNAL MEDICINE

## 2021-03-20 PROCEDURE — 84300 ASSAY OF URINE SODIUM: CPT | Performed by: INTERNAL MEDICINE

## 2021-03-20 PROCEDURE — 93005 ELECTROCARDIOGRAM TRACING: CPT

## 2021-03-20 PROCEDURE — 71045 X-RAY EXAM CHEST 1 VIEW: CPT

## 2021-03-20 PROCEDURE — 99204 OFFICE O/P NEW MOD 45 MIN: CPT | Performed by: PREVENTIVE MEDICINE

## 2021-03-20 PROCEDURE — 0241U HB NFCT DS VIR RESP RNA 4 TRGT: CPT | Performed by: EMERGENCY MEDICINE

## 2021-03-20 PROCEDURE — 99285 EMERGENCY DEPT VISIT HI MDM: CPT

## 2021-03-20 PROCEDURE — 86140 C-REACTIVE PROTEIN: CPT | Performed by: EMERGENCY MEDICINE

## 2021-03-20 PROCEDURE — 82553 CREATINE MB FRACTION: CPT | Performed by: EMERGENCY MEDICINE

## 2021-03-20 PROCEDURE — 84484 ASSAY OF TROPONIN QUANT: CPT | Performed by: EMERGENCY MEDICINE

## 2021-03-20 PROCEDURE — 83930 ASSAY OF BLOOD OSMOLALITY: CPT | Performed by: INTERNAL MEDICINE

## 2021-03-20 PROCEDURE — 84443 ASSAY THYROID STIM HORMONE: CPT | Performed by: EMERGENCY MEDICINE

## 2021-03-20 PROCEDURE — 80053 COMPREHEN METABOLIC PANEL: CPT | Performed by: EMERGENCY MEDICINE

## 2021-03-20 PROCEDURE — 36415 COLL VENOUS BLD VENIPUNCTURE: CPT | Performed by: EMERGENCY MEDICINE

## 2021-03-20 PROCEDURE — 82728 ASSAY OF FERRITIN: CPT | Performed by: INTERNAL MEDICINE

## 2021-03-20 RX ORDER — ASPIRIN 81 MG/1
81 TABLET, CHEWABLE ORAL DAILY
Status: DISCONTINUED | OUTPATIENT
Start: 2021-03-21 | End: 2021-03-22 | Stop reason: HOSPADM

## 2021-03-20 RX ORDER — ZINC SULFATE 50(220)MG
220 CAPSULE ORAL DAILY
Status: DISCONTINUED | OUTPATIENT
Start: 2021-03-21 | End: 2021-03-22 | Stop reason: HOSPADM

## 2021-03-20 RX ORDER — PRAVASTATIN SODIUM 40 MG
40 TABLET ORAL
Status: DISCONTINUED | OUTPATIENT
Start: 2021-03-21 | End: 2021-03-22 | Stop reason: HOSPADM

## 2021-03-20 RX ORDER — MELATONIN
1000 DAILY
Status: DISCONTINUED | OUTPATIENT
Start: 2021-03-21 | End: 2021-03-20

## 2021-03-20 RX ORDER — MULTIVITAMIN/IRON/FOLIC ACID 18MG-0.4MG
1 TABLET ORAL DAILY
Status: DISCONTINUED | OUTPATIENT
Start: 2021-03-28 | End: 2021-03-20

## 2021-03-20 RX ORDER — FAMOTIDINE 20 MG/1
20 TABLET, FILM COATED ORAL DAILY
Status: DISCONTINUED | OUTPATIENT
Start: 2021-03-21 | End: 2021-03-22 | Stop reason: HOSPADM

## 2021-03-20 RX ORDER — ASCORBIC ACID 500 MG
1000 TABLET ORAL EVERY 12 HOURS SCHEDULED
Status: DISCONTINUED | OUTPATIENT
Start: 2021-03-20 | End: 2021-03-22 | Stop reason: HOSPADM

## 2021-03-20 RX ORDER — MELATONIN
2000 DAILY
Status: DISCONTINUED | OUTPATIENT
Start: 2021-03-21 | End: 2021-03-22 | Stop reason: HOSPADM

## 2021-03-20 RX ORDER — LABETALOL 20 MG/4 ML (5 MG/ML) INTRAVENOUS SYRINGE
10 EVERY 4 HOURS PRN
Status: DISCONTINUED | OUTPATIENT
Start: 2021-03-20 | End: 2021-03-22

## 2021-03-20 RX ORDER — CALCIUM CARBONATE 500(1250)
1 TABLET ORAL
Status: DISCONTINUED | OUTPATIENT
Start: 2021-03-21 | End: 2021-03-22 | Stop reason: HOSPADM

## 2021-03-20 RX ADMIN — OXYCODONE HYDROCHLORIDE AND ACETAMINOPHEN 1000 MG: 500 TABLET ORAL at 20:33

## 2021-03-20 RX ADMIN — REMDESIVIR 200 MG: 100 INJECTION, POWDER, LYOPHILIZED, FOR SOLUTION INTRAVENOUS at 20:10

## 2021-03-20 RX ADMIN — CARVEDILOL 9.38 MG: 6.25 TABLET, FILM COATED ORAL at 21:11

## 2021-03-20 RX ADMIN — LABETALOL 20 MG/4 ML (5 MG/ML) INTRAVENOUS SYRINGE 10 MG: at 20:34

## 2021-03-20 RX ADMIN — RIVAROXABAN 15 MG: 15 TABLET, FILM COATED ORAL at 21:25

## 2021-03-20 NOTE — ASSESSMENT & PLAN NOTE
Lab Results   Component Value Date    SARSCOV2 Positive (A) 03/20/2021     · Patient presents with 3 week history of diarrhea and a 1 5 week history of cough worsening fatigue and weakness  · SpO2 96% on room air  · Chest x-ray appears to show volume overload  · Mild COVID    Plan:  · Famotidine, Atorvastatin, Vitamin D3, Vitamin C, Zinc x 7 days then multivitamin qd  · Remdesevir Day 1/5  · Therapeutic Anticoagulation per guidelines  · PT and OT eval and treat, Self-proning if able, Incentive spirometry  · Trend labs as needed  · Follow-up chest x-ray

## 2021-03-20 NOTE — ASSESSMENT & PLAN NOTE
· Patient remains hyponatremic at 131  · Likely secondary to poor p o   Intake, she says her appetite has been poor at home, but that she has been drinking 6-8 glasses of water daily  · Low serum osmolality, Urine OSM at 324, Urine Na 24    Plan:  · Follow-up morning BMP  · Encourage diet

## 2021-03-20 NOTE — ASSESSMENT & PLAN NOTE
· Patient has history of atrial fibrillation, on Coreg 9 375 mg b i d  And Xarelto 20 mg daily  · Tachycardic on admission, rates persistently elevated to greater than 100  · Patient does have crackles at the bases on exam, and chest x-ray shows volume overload    Plan:  · Continue home medications  · P r n   Labetalol for hypertension and tachycardia

## 2021-03-20 NOTE — ASSESSMENT & PLAN NOTE
· Patient has history of hypertension, on Coreg 9 375 mg b i d  At home  · Blood pressure is elevated to 178/99 on admission  Currently acceptable  Plan:  · Continue home Coreg  · IV Labetalol p r n   For persistent tachycardia and hypertension

## 2021-03-20 NOTE — ASSESSMENT & PLAN NOTE
· Patient has history of hyperlipidemia, on simvastatin 20 mg daily at home    Plan:  · Continue home simvastatin

## 2021-03-20 NOTE — ASSESSMENT & PLAN NOTE
· Patient presents with 3 week history of diarrhea  · Hyponatremia to 131 on admission  · Echo in 2019 shows LVEF 65% without regional wall motion abnormalities and mild to moderate mitral valvular regurgitation  · Creatinine 1 39, patient does not have many recent labs, unsure if this is above her baseline    Plan:  · C diff toxin

## 2021-03-20 NOTE — H&P
[facility]  H&P- Rock Peralta 11/21/1926, 80 y o  female MRN: [id number]  Unit/Bed#: ED 28 Encounter: [id number]  Primary Care Provider: YISSEL Watkins   Date and time admitted to hospital: 3/20/2021  3:14 PM      * COVID-19  Assessment & Plan  Lab Results   Component Value Date    SARSCOV2 Positive (A) 03/20/2021     · Patient presents with diarrhea that began approximately 3 weeks ago, but resolved after 7-10 days and a 1 5 week history of cough with worsening fatigue and weakness  · SpO2 96% on room air  · Chest x-ray appears to show volume overload  · Mild COVID    Plan:  · Famotidine, Atorvastatin, Vitamin D3, Vitamin C, Zinc x 7 days then multivitamin qd  · Remdesevir Day 1/5  · Therapeutic Anticoagulation with Xarelto  · PT and OT eval and treat, Self-proning if able, Incentive spirometry  · Trend labs as needed  · Follow-up chest x-ray    Hypertensive urgency  Assessment & Plan  · Patient presents with elevated blood pressures to 194/97 in the emergency room  · She has history of hypertension on Coreg 9 375 mg b i d  At home  · Patient denies headaches and vision changes    Plan:  · Continue home Coreg  · P r n  Labetalol    Permanent atrial fibrillation (HCC)  Assessment & Plan  · Patient has history of atrial fibrillation, on Coreg 9 375 mg b i d  And Xarelto 20 mg daily  · Tachycardic on admission, rates persistently elevated to greater than 100  · Patient does have crackles at the bases on exam, and chest x-ray shows volume overload    Plan:  · Continue home medications  · P r n  Labetalol for hypertension and tachycardia    Generalized weakness  Assessment & Plan  · Patient presents with generalized weakness at home likely secondary to diarrhea and COVID    Plan:  · PT/OT eval and treat    Hyponatremia  Assessment & Plan  · Patient is hyponatremic to 131 on admission  · Likely secondary to poor p o   Intake, she says her appetite has been poor at home, but that she has been drinking 6-8 glasses of water daily    Plan:  · Follow-up morning BMP  · Encourage diet  · Serum osmolality, urine osmolality, urine sodium for etiology    Hypertension  Assessment & Plan  · Patient has history of hypertension, on Coreg 9 375 mg b i d  At home  · Blood pressure is elevated to 178/99 on admission    Plan:  · Continue home Coreg  · IV Labetalol p r n  For persistent tachycardia and hypertension    Hyperlipidemia  Assessment & Plan  · Patient has history of hyperlipidemia, on simvastatin 20 mg daily at home    Plan:  · Will replace with pravastatin 40 mg daily will on hospital    VTE Prophylaxis: Rivaroxaban (Xarelto)  / sequential compression device   Code Status:  Level 3 DNR/DNI  POLST: POLST form is not discussed and not completed at this time  Anticipated Length of Stay:  Patient will be admitted on an Inpatient basis with an anticipated length of stay of  at least 2 midnights  Justification for Hospital Stay:  COVID-19 with generalized weakness and cough    Chief Complaint:   Cough    History of Present Illness:    Sera Rico is a 80 y o  female with a past medical history significant for atrial fibrillation on Xarelto, hypertension, and hyperlipidemia who presents with a one-week history of an unrelenting cough at home with associated fatigue and weakness  The patient states that her symptoms started approximately 3 weeks ago with diarrhea lasting 7-10 days  She states that this has resolved, but that she has now had a cough that has not been going away  She has also noticed some lightheadedness during this time  She denies headaches and vision changes  She states that during this time she has had poor p o  Intake, and minimal appetite  She says that she has continue to hydrate, drinking 6-8 glasses of water daily  She denies any shortness of breath and chest pain at this time  She denies abdominal pain      Review of Systems:    Review of Systems   Constitutional: Positive for activity change, appetite change and fatigue  Negative for chills and fever  HENT: Negative for rhinorrhea, sinus pressure, sinus pain and sore throat  Eyes: Negative for photophobia, pain and visual disturbance  Respiratory: Positive for cough  Negative for shortness of breath and wheezing  Cardiovascular: Negative for chest pain, palpitations and leg swelling  Gastrointestinal: Positive for diarrhea  Negative for abdominal pain, constipation, nausea and vomiting  Genitourinary: Negative for dysuria, frequency, hematuria and urgency  Musculoskeletal: Negative for arthralgias and back pain  Skin: Negative for color change and rash  Neurological: Positive for light-headedness  Negative for syncope, weakness, numbness and headaches  Psychiatric/Behavioral: Negative for agitation and behavioral problems  All other systems reviewed and are negative  Past Medical and Surgical History:     Past Medical History:   Diagnosis Date    Hyperlipidemia     Hypertension        Past Surgical History:   Procedure Laterality Date    BREAST CYST EXCISION Right 1984    benign    HYSTERECTOMY      age 44       Meds/Allergies:    Prior to Admission medications    Medication Sig Start Date End Date Taking?  Authorizing Provider   calcium carbonate (OS-ESTEBAN) 600 MG tablet Take 600 mg by mouth daily   Yes Historical Provider, MD   carvedilol (COREG) 12 5 mg tablet Take 1 tablet by mouth 2 (two) times a day with meals for 30 days  Patient taking differently: Take 6 25 mg by mouth 2 (two) times a day with meals  9/24/17  Yes Mayco Jasmine DO   carvedilol (COREG) 3 125 mg tablet Take 1 tablet (3 125 mg total) by mouth 2 (two) times a day with meals Coreg 3 125mg BID in addition to Coreg 6 25mg BID 8/7/20  Yes Christopher Nice MD   cholecalciferol (VITAMIN D3) 1,000 units tablet Take 1,000 Units by mouth daily   Yes Historical Provider, MD   loratadine (CLARITIN) 10 mg tablet Daily   Yes Historical Provider, MD   Multiple Vitamins-Minerals (MULTIVITAMIN ADULT PO) Take 1 tablet by mouth daily   Yes Historical Provider, MD   Psyllium (METAMUCIL PO) Take by mouth daily   Yes Historical Provider, MD   simvastatin (ZOCOR) 20 mg tablet 20 mg daily  11/12/19  Yes Historical Provider, MD   Destiny Saucer 20 MG tablet daily with breakfast  11/12/19  Yes Historical Provider, MD   aspirin 81 mg chewable tablet Chew 81 mg daily    Historical Provider, MD   Blood Pressure Monitoring (ADULT BLOOD PRESSURE CUFF LG) KIT by Does not apply route once for 1 dose 9/24/17 12/5/19  Sterling Cabrera DO     I have reviewed home medications with patient personally  Allergies: No Known Allergies    Social History:     Marital Status:    Occupation:  Retired  Patient Pre-hospital Living Situation:  Independent  Patient Pre-hospital Level of Mobility:  Ambulatory  Patient Pre-hospital Diet Restrictions:  None  Substance Use History:   Social History     Substance and Sexual Activity   Alcohol Use Yes    Comment: 1x a week     Social History     Tobacco Use   Smoking Status Former Smoker    Types: Cigarettes   Smokeless Tobacco Never Used   Tobacco Comment    quit 50 years ago     Social History     Substance and Sexual Activity   Drug Use No       Family History:    History reviewed  No pertinent family history  Physical Exam:     Vitals:   Blood Pressure: (!) 178/99 (03/20/21 1800)  Pulse: (!) 114 (03/20/21 1800)  Temperature: 98 5 °F (36 9 °C) (03/20/21 1507)  Temp Source: Oral (03/20/21 1507)  Respirations: 20 (03/20/21 1800)  SpO2: 96 % (03/20/21 1800)    Physical Exam  Vitals signs reviewed  Constitutional:       General: She is not in acute distress  Appearance: Normal appearance  She is obese  She is not ill-appearing, toxic-appearing or diaphoretic  HENT:      Head: Normocephalic and atraumatic  Eyes:      General: No scleral icterus  Right eye: No discharge  Left eye: No discharge        Extraocular Movements: Extraocular movements intact  Conjunctiva/sclera: Conjunctivae normal       Pupils: Pupils are equal, round, and reactive to light  Cardiovascular:      Rate and Rhythm: Tachycardia present  Rhythm irregular  Pulses: Normal pulses  Heart sounds: Normal heart sounds  No murmur  Pulmonary:      Effort: Pulmonary effort is normal  No respiratory distress  Breath sounds: Rales present  No wheezing or rhonchi  Comments: Bilateral rales at the bases  Abdominal:      General: Abdomen is flat  Bowel sounds are normal  There is no distension  Palpations: Abdomen is soft  Tenderness: There is no abdominal tenderness  Musculoskeletal:         General: No deformity  Right lower leg: No edema  Left lower leg: No edema  Neurological:      General: No focal deficit present  Mental Status: She is alert and oriented to person, place, and time  Mental status is at baseline  Cranial Nerves: No cranial nerve deficit  Sensory: No sensory deficit  Motor: No weakness  Psychiatric:         Mood and Affect: Mood normal          Behavior: Behavior normal          Additional Data:     Lab Results: I have personally reviewed pertinent reports  Results from last 7 days   Lab Units 03/20/21  1614   WBC Thousand/uL 5 39   HEMOGLOBIN g/dL 14 2   HEMATOCRIT % 42 8   PLATELETS Thousands/uL 159   NEUTROS PCT % 64   LYMPHS PCT % 25   MONOS PCT % 10   EOS PCT % 0     Results from last 7 days   Lab Units 03/20/21  1614   POTASSIUM mmol/L 4 5   CHLORIDE mmol/L 95*   CO2 mmol/L 28   BUN mg/dL 20   CREATININE mg/dL 1 39*   CALCIUM mg/dL 9 1   ALK PHOS U/L 64   ALT U/L 34   AST U/L 42           Imaging: I have personally reviewed pertinent reports  No results found  EKG, Pathology, and Other Studies Reviewed on Admission:   · EKG:  None, irregularly irregular tachycardic rhythm on telemetry strip noted in the ED    Epic / Care Everywhere Records Reviewed:  Yes ** Please Note: This note has been constructed using a voice recognition system   **

## 2021-03-20 NOTE — ASSESSMENT & PLAN NOTE
· Patient has history of hypertension, on Coreg 9 375 mg b i d  At home  · Blood pressure is elevated to 178/99 on admission    Plan:  · Continue home Coreg  · IV Labetalol p r n   For persistent tachycardia and hypertension

## 2021-03-20 NOTE — ED PROVIDER NOTES
History  Chief Complaint   Patient presents with    Fatigue     C/o cough, fatigue, sore throat, diarrhea, nausea starting last week  Denies fevers   Cough     HPI     80-year-old female with history of atrial fibrillation on Xarelto, hypertension, hyperlipidemia, presenting for evaluation of multiple complaints  The patient states that a month ago she had a really bad stomach bug   Reports experiencing tender more episodes of watery diarrhea daily  This lasted for about 2 weeks, and then improved  She has recently been having 1-2 episodes of diarrhea daily  Reports nausea without vomiting  No abdominal pain, fevers, or chills  Over the last week she has developed profound fatigue and a cough  Cough is dry, occasionally productive of scant sputum  Denies chest pain  She has been compliant with her home Xarelto  Also reports sore throat  Reports decreased appetite but is drinking fluids  Of note, the patient states that yesterday during the day she felt really weak  She was walking to her kitchen to get some water when she began to feel lightheaded  She lowered herself to the ground  Does not think that she completely lost consciousness but is unsure  She landed on her bottom, is adamant that she lowered herself slowly and she did not fall  Denies hitting her head  No loss of consciousness  Prior to Admission Medications   Prescriptions Last Dose Informant Patient Reported? Taking?    Blood Pressure Monitoring (ADULT BLOOD PRESSURE CUFF LG) KIT  Self No No   Sig: by Does not apply route once for 1 dose   Multiple Vitamins-Minerals (MULTIVITAMIN ADULT PO) 3/20/2021 at Unknown time Self Yes Yes   Sig: Take 1 tablet by mouth daily   Psyllium (METAMUCIL PO) 3/20/2021 at Unknown time Self Yes Yes   Sig: Take by mouth daily   XARELTO 20 MG tablet 3/19/2021 at Unknown time Self Yes Yes   Sig: daily with breakfast    aspirin 81 mg chewable tablet Not Taking at Unknown time Self Yes No   Sig: Chew 81 mg daily   calcium carbonate (OS-ESTEBAN) 600 MG tablet 3/20/2021 at Unknown time Self Yes Yes   Sig: Take 600 mg by mouth daily   carvedilol (COREG) 12 5 mg tablet 3/20/2021 at Unknown time Self No Yes   Sig: Take 1 tablet by mouth 2 (two) times a day with meals for 30 days   Patient taking differently: Take 6 25 mg by mouth 2 (two) times a day with meals    carvedilol (COREG) 3 125 mg tablet 3/20/2021 at Unknown time Self No Yes   Sig: Take 1 tablet (3 125 mg total) by mouth 2 (two) times a day with meals Coreg 3 125mg BID in addition to Coreg 6 25mg BID   cholecalciferol (VITAMIN D3) 1,000 units tablet 3/20/2021 at Unknown time Self Yes Yes   Sig: Take 1,000 Units by mouth daily   loratadine (CLARITIN) 10 mg tablet 3/20/2021 at Unknown time Self Yes Yes   Sig: Daily   simvastatin (ZOCOR) 20 mg tablet 3/19/2021 at Unknown time Self Yes Yes   Si mg daily       Facility-Administered Medications: None       Past Medical History:   Diagnosis Date    Hyperlipidemia     Hypertension        Past Surgical History:   Procedure Laterality Date    BREAST CYST EXCISION Right 1984    benign    HYSTERECTOMY      age 44       History reviewed  No pertinent family history  I have reviewed and agree with the history as documented  E-Cigarette/Vaping     E-Cigarette/Vaping Substances     Social History     Tobacco Use    Smoking status: Former Smoker     Types: Cigarettes    Smokeless tobacco: Never Used    Tobacco comment: quit 50 years ago   Substance Use Topics    Alcohol use: Yes     Comment: 1x a week    Drug use: No       Review of Systems   Constitutional: Positive for fatigue  Negative for chills and fever  HENT: Positive for sore throat  Negative for congestion  Eyes: Negative for visual disturbance  Respiratory: Positive for cough  Negative for shortness of breath  Cardiovascular: Negative for chest pain and leg swelling  Gastrointestinal: Positive for diarrhea and nausea   Negative for abdominal pain and vomiting  Genitourinary: Negative for dysuria and frequency  Musculoskeletal: Negative for arthralgias, back pain, neck pain and neck stiffness  Skin: Negative for rash  Neurological: Negative for weakness, numbness and headaches  Psychiatric/Behavioral: Negative for agitation, behavioral problems and confusion  Physical Exam  Physical Exam  Constitutional:       General: She is not in acute distress  Appearance: She is well-developed  She is not diaphoretic  HENT:      Head: Normocephalic and atraumatic  Right Ear: External ear normal       Left Ear: External ear normal       Nose: Nose normal    Eyes:      Conjunctiva/sclera: Conjunctivae normal    Neck:      Musculoskeletal: Normal range of motion and neck supple  Cardiovascular:      Rate and Rhythm: Tachycardia present  Rhythm irregular  Pulses: Normal pulses  Heart sounds: Normal heart sounds  No murmur  No friction rub  No gallop  Pulmonary:      Effort: Pulmonary effort is normal  No respiratory distress  Breath sounds: Normal breath sounds  No wheezing or rales  Abdominal:      General: Bowel sounds are normal  There is no distension  Palpations: Abdomen is soft  Tenderness: There is no abdominal tenderness  There is no guarding  Musculoskeletal: Normal range of motion  General: No deformity  Comments: No calf swelling or tenderness  No midline tenderness to palpation over the C, T, or L-spine  Skin:     General: Skin is warm and dry  Neurological:      Mental Status: She is alert and oriented to person, place, and time  Motor: No abnormal muscle tone  Comments: Face symmetric  Normal speech  5/5 strength in the proximal and distal bilateral upper and lower extremities     Psychiatric:         Mood and Affect: Mood normal          Vital Signs  ED Triage Vitals [03/20/21 1507]   Temperature Pulse Respirations Blood Pressure SpO2   98 5 °F (36 9 °C) (!) 108 20 128/69 97 %      Temp Source Heart Rate Source Patient Position - Orthostatic VS BP Location FiO2 (%)   Oral Monitor Sitting Left arm --      Pain Score       No Pain           Vitals:    03/20/21 1507 03/20/21 1713 03/20/21 1755 03/20/21 1800   BP: 128/69 (!) 172/77  (!) 178/99   Pulse: (!) 108 (!) 112 (!) 120 (!) 114   Patient Position - Orthostatic VS: Sitting Sitting  Lying         Visual Acuity  Visual Acuity      Most Recent Value   L Pupil Size (mm)  3   R Pupil Size (mm)  3          ED Medications  Medications - No data to display    Diagnostic Studies  Results Reviewed     Procedure Component Value Units Date/Time    CK (with reflex to MB) [[id number]]     Lab Status: No result Specimen: Blood     NT-BNP PRO [[id number]]     Lab Status: No result Specimen: Blood     C-reactive protein [[id number]]     Lab Status: No result Specimen: Blood     Ferritin [[id number]]     Lab Status: No result Specimen: Blood     COVID19, Influenza A/B, RSV PCR, [facility] [[id number]]  (Abnormal) Collected: 03/20/21 1614    Lab Status: Final result Specimen: Nares from Nose Updated: 03/20/21 1711     SARS-CoV-2 Positive     INFLUENZA A PCR Negative     INFLUENZA B PCR Negative     RSV PCR Negative    Narrative: This test has been authorized by FDA under an EUA (Emergency Use Assay) for use by authorized laboratories  Clinical caution and judgement should be used with the interpretation of these results with consideration of the clinical impression and other laboratory testing  Testing reported as "Positive" or "Negative" has been proven to be accurate according to standard laboratory validation requirements  All testing is performed with control materials showing appropriate reactivity at standard intervals      TSH [[id number]]  (Abnormal) Collected: 03/20/21 1614    Lab Status: Final result Specimen: Blood from Arm, Right Updated: 03/20/21 1651     TSH 3RD GENERATON 4 064 uIU/mL     Narrative:      Patients undergoing fluorescein dye angiography may retain small amounts of fluorescein in the body for 48-72 hours post procedure  Samples containing fluorescein can produce falsely depressed TSH values  If the patient had this procedure,a specimen should be resubmitted post fluorescein clearance        Comprehensive metabolic panel [[id number]]  (Abnormal) Collected: 03/20/21 1614    Lab Status: Final result Specimen: Blood from Arm, Right Updated: 03/20/21 1650     Sodium 131 mmol/L      Potassium 4 5 mmol/L      Chloride 95 mmol/L      CO2 28 mmol/L      ANION GAP 8 mmol/L      BUN 20 mg/dL      Creatinine 1 39 mg/dL      Glucose 114 mg/dL      Calcium 9 1 mg/dL      Corrected Calcium 9 7 mg/dL      AST 42 U/L      ALT 34 U/L      Alkaline Phosphatase 64 U/L      Total Protein 8 0 g/dL      Albumin 3 3 g/dL      Total Bilirubin 0 41 mg/dL      eGFR 32 ml/min/1 73sq m     Narrative:      [place] guidelines for Chronic Kidney Disease (CKD):     Stage 1 with normal or high GFR (GFR > 90 mL/min/1 73 square meters)    Stage 2 Mild CKD (GFR = 60-89 mL/min/1 73 square meters)    Stage 3A Moderate CKD (GFR = 45-59 mL/min/1 73 square meters)    Stage 3B Moderate CKD (GFR = 30-44 mL/min/1 73 square meters)    Stage 4 Severe CKD (GFR = 15-29 mL/min/1 73 square meters)    Stage 5 End Stage CKD (GFR <15 mL/min/1 73 square meters)  Note: GFR calculation is accurate only with a steady state creatinine    Troponin I [[id number]]  (Normal) Collected: 03/20/21 1614    Lab Status: Final result Specimen: Blood from Arm, Right Updated: 03/20/21 1642     Troponin I <0 02 ng/mL     CBC and differential [[id number]] Collected: 03/20/21 1614    Lab Status: Final result Specimen: Blood from Arm, Right Updated: 03/20/21 1625     WBC 5 39 Thousand/uL      RBC 4 79 Million/uL      Hemoglobin 14 2 g/dL      Hematocrit 42 8 %      MCV 89 fL      MCH 29 6 pg      MCHC 33 2 g/dL      RDW 12 3 %      MPV 9 9 fL      Platelets 159 Thousands/uL      nRBC 0 /100 WBCs      Neutrophils Relative 64 %      Immat GRANS % 1 %      Lymphocytes Relative 25 %      Monocytes Relative 10 %      Eosinophils Relative 0 %      Basophils Relative 0 %      Neutrophils Absolute 3 47 Thousands/µL      Immature Grans Absolute 0 04 Thousand/uL      Lymphocytes Absolute 1 34 Thousands/µL      Monocytes Absolute 0 51 Thousand/µL      Eosinophils Absolute 0 01 Thousand/µL      Basophils Absolute 0 02 Thousands/µL     Clostridium difficile toxin by PCR with EIA [[id number]]     Lab Status: No result Specimen: Stool from Per Rectum                  XR chest 1 view portable    (Results Pending)              Procedures  Procedures         ED Course  ED Course as of Mar 20 1831   Sat Mar 20, 2021   1755 Pulse(!): 112                             SBIRT 20yo+      Most Recent Value   SBIRT (23 yo +)   In order to provide better care to our patients, we are screening all of our patients for alcohol and drug use  Would it be okay to ask you these screening questions? Yes Filed at: 03/20/2021 1519   Initial Alcohol Screen: US AUDIT-C    1  How often do you have a drink containing alcohol? 3 Filed at: 03/20/2021 1519   2  How many drinks containing alcohol do you have on a typical day you are drinking? 1 Filed at: 03/20/2021 1519   3a  Male UNDER 65: How often do you have five or more drinks on one occasion? 0 Filed at: 03/20/2021 1519   3b  FEMALE Any Age, or MALE 65+: How often do you have 4 or more drinks on one occassion? 0 Filed at: 03/20/2021 1519   Audit-C Score  4 Filed at: 03/20/2021 1519   SINA: How many times in the past year have you    Used an illegal drug or used a prescription medication for non-medical reasons?   Never Filed at: 03/20/2021 1519                    MDM  Number of Diagnoses or Management Options  COVID-19: new and requires workup  Generalized weakness: new and requires workup  Postural dizziness with presyncope: new and requires workup  Tachycardia: new and requires workup  Diagnosis management comments: Generally well-appearing  Afebrile and HDS  No increased WOB and sating well on RA  Pt is persistently tachycardic with heart rate between 110 and 120 at rest, up to 130 with ambulation  No CP, no dyspnea, no O2 requirement, doubt PE  I personally interpreted the pt's EKG, which reveal atrial fibrillation to 107 bpm, normal axis, normal intervals, no ischemic changes, unchanged from most recent prior  Cr 1 39, at baseline  CBC unremarkable  Troponin WNL  TSH mildly elevated  COVID-19 testing is positive  CXR with faint infiltrates to the RLL on my read  Patient does not have an oxygen requirement and is sating well on RA  Due to generalized weakness, persistent tachycardia, and near-syncope in the setting of COVID-19, will admit for further management          Amount and/or Complexity of Data Reviewed  Clinical lab tests: ordered and reviewed  Tests in the radiology section of CPT®: ordered and reviewed  Review and summarize past medical records: yes  Discuss the patient with other providers: yes  Independent visualization of images, tracings, or specimens: yes    Patient Progress  Patient progress: stable           Disposition  Final diagnoses:   COVID-19   Postural dizziness with presyncope   Tachycardia   Generalized weakness     Time reflects when diagnosis was documented in both MDM as applicable and the Disposition within this note     Time User Action Codes Description Comment    3/20/2021  6:16 PM Cherilynn Brittle [U07 1] COVID-19     3/20/2021  6:16 PM Karolyn Gavel Add [X57,  R55] Postural dizziness with presyncope     3/20/2021  6:16 PM Karolyn Gavel Add [R00 0] Tachycardia     3/20/2021  6:16 PM Karolyn Gavel Add [R53 1] Generalized weakness       ED Disposition     ED Disposition Condition Date/Time Comment    Admit Stable Sat Mar 20, 2021  6:16 PM Case was discussed with ZEKE and the patient's admission status was agreed to be Admission Status: inpatient status to the service of Dr Alia Monique  Follow-up Information    None         Patient's Medications   Discharge Prescriptions    No medications on file     No discharge procedures on file      PDMP Review     None          ED Provider  Electronically Signed by           Gulshan Teran MD  03/20/21 5855

## 2021-03-20 NOTE — ASSESSMENT & PLAN NOTE
Lab Results   Component Value Date    SARSCOV2 Positive (A) 03/20/2021     · Patient presents with diarrhea that began approximately 3 weeks ago, but resolved after 7-10 days and a 1 5 week history of cough with worsening fatigue and weakness  · SpO2 96% on room air  · Chest x-ray appears to show volume overload  · Mild COVID    Plan:  · Famotidine, Atorvastatin, Vitamin D3, Vitamin C, Zinc x 7 days then multivitamin qd  · Remdesevir Day 2/5  · Therapeutic Anticoagulation with Xarelto  · PT and OT eval and treat, Self-proning if able, Incentive spirometry  · Trend labs as needed  · Follow-up chest x-ray

## 2021-03-20 NOTE — ASSESSMENT & PLAN NOTE
· Patient presents with elevated blood pressures to 194/97 in the emergency room  · She has history of hypertension on Coreg 9 375 mg b i d  At home  · Patient denies headaches and vision changes    Plan:  · Continue home Coreg  · P r n   Labetalol

## 2021-03-20 NOTE — ASSESSMENT & PLAN NOTE
· Patient has history of hyperlipidemia, on simvastatin 20 mg daily at home    Plan:  · Continue with pravastatin 40 mg daily

## 2021-03-20 NOTE — ASSESSMENT & PLAN NOTE
· Patient has history of atrial fibrillation, on Coreg 9 375 mg b i d  And Xarelto 20 mg daily  · On Tely, HR < 110    Plan:  · Continue home medications  · P r n   Labetalol for hypertension and tachycardia

## 2021-03-20 NOTE — ASSESSMENT & PLAN NOTE
· Patient presents with generalized weakness at home likely secondary to diarrhea and COVID    Plan:  · PT/OT eval and treat

## 2021-03-20 NOTE — ASSESSMENT & PLAN NOTE
· Patient is hyponatremic to 131 on admission  · Likely secondary to poor p o   Intake, she says her appetite has been poor at home, but that she has been drinking 6-8 glasses of water daily    Plan:  · Follow-up morning BMP  · Encourage diet  · Serum osmolality, urine osmolality, urine sodium for etiology

## 2021-03-21 LAB
ALBUMIN SERPL BCP-MCNC: 3.1 G/DL (ref 3.5–5)
ALP SERPL-CCNC: 61 U/L (ref 46–116)
ALT SERPL W P-5'-P-CCNC: 28 U/L (ref 12–78)
ANION GAP SERPL CALCULATED.3IONS-SCNC: 10 MMOL/L (ref 4–13)
AST SERPL W P-5'-P-CCNC: 44 U/L (ref 5–45)
BASOPHILS # BLD AUTO: 0.02 THOUSANDS/ΜL (ref 0–0.1)
BASOPHILS NFR BLD AUTO: 0 % (ref 0–1)
BILIRUB SERPL-MCNC: 0.35 MG/DL (ref 0.2–1)
BUN SERPL-MCNC: 19 MG/DL (ref 5–25)
CALCIUM ALBUM COR SERPL-MCNC: 9.2 MG/DL (ref 8.3–10.1)
CALCIUM SERPL-MCNC: 8.5 MG/DL (ref 8.3–10.1)
CHLORIDE SERPL-SCNC: 99 MMOL/L (ref 100–108)
CO2 SERPL-SCNC: 22 MMOL/L (ref 21–32)
CREAT SERPL-MCNC: 1.18 MG/DL (ref 0.6–1.3)
CRP SERPL QL: 44.9 MG/L
D DIMER PPP FEU-MCNC: 0.36 UG/ML FEU
EOSINOPHIL # BLD AUTO: 0.01 THOUSAND/ΜL (ref 0–0.61)
EOSINOPHIL NFR BLD AUTO: 0 % (ref 0–6)
ERYTHROCYTE [DISTWIDTH] IN BLOOD BY AUTOMATED COUNT: 12.3 % (ref 11.6–15.1)
FERRITIN SERPL-MCNC: 380 NG/ML (ref 8–388)
FERRITIN SERPL-MCNC: 407 NG/ML (ref 8–388)
GFR SERPL CREATININE-BSD FRML MDRD: 40 ML/MIN/1.73SQ M
GLUCOSE SERPL-MCNC: 102 MG/DL (ref 65–140)
HCT VFR BLD AUTO: 40.5 % (ref 34.8–46.1)
HGB BLD-MCNC: 13.7 G/DL (ref 11.5–15.4)
IMM GRANULOCYTES # BLD AUTO: 0.03 THOUSAND/UL (ref 0–0.2)
IMM GRANULOCYTES NFR BLD AUTO: 1 % (ref 0–2)
LYMPHOCYTES # BLD AUTO: 1.7 THOUSANDS/ΜL (ref 0.6–4.47)
LYMPHOCYTES NFR BLD AUTO: 32 % (ref 14–44)
MCH RBC QN AUTO: 29.7 PG (ref 26.8–34.3)
MCHC RBC AUTO-ENTMCNC: 33.8 G/DL (ref 31.4–37.4)
MCV RBC AUTO: 88 FL (ref 82–98)
MONOCYTES # BLD AUTO: 0.56 THOUSAND/ΜL (ref 0.17–1.22)
MONOCYTES NFR BLD AUTO: 10 % (ref 4–12)
NEUTROPHILS # BLD AUTO: 3.08 THOUSANDS/ΜL (ref 1.85–7.62)
NEUTS SEG NFR BLD AUTO: 57 % (ref 43–75)
NRBC BLD AUTO-RTO: 0 /100 WBCS
PLATELET # BLD AUTO: 163 THOUSANDS/UL (ref 149–390)
PMV BLD AUTO: 10.1 FL (ref 8.9–12.7)
POTASSIUM SERPL-SCNC: 4 MMOL/L (ref 3.5–5.3)
PROCALCITONIN SERPL-MCNC: <0.05 NG/ML
PROT SERPL-MCNC: 7.5 G/DL (ref 6.4–8.2)
RBC # BLD AUTO: 4.61 MILLION/UL (ref 3.81–5.12)
SODIUM 24H UR-SCNC: 34 MOL/L
SODIUM SERPL-SCNC: 131 MMOL/L (ref 136–145)
WBC # BLD AUTO: 5.4 THOUSAND/UL (ref 4.31–10.16)

## 2021-03-21 PROCEDURE — 85025 COMPLETE CBC W/AUTO DIFF WBC: CPT | Performed by: INTERNAL MEDICINE

## 2021-03-21 PROCEDURE — 99232 SBSQ HOSP IP/OBS MODERATE 35: CPT | Performed by: INTERNAL MEDICINE

## 2021-03-21 PROCEDURE — 86140 C-REACTIVE PROTEIN: CPT | Performed by: INTERNAL MEDICINE

## 2021-03-21 PROCEDURE — 85379 FIBRIN DEGRADATION QUANT: CPT | Performed by: INTERNAL MEDICINE

## 2021-03-21 PROCEDURE — 84145 PROCALCITONIN (PCT): CPT | Performed by: INTERNAL MEDICINE

## 2021-03-21 PROCEDURE — 82728 ASSAY OF FERRITIN: CPT | Performed by: INTERNAL MEDICINE

## 2021-03-21 PROCEDURE — 80053 COMPREHEN METABOLIC PANEL: CPT | Performed by: INTERNAL MEDICINE

## 2021-03-21 PROCEDURE — 93005 ELECTROCARDIOGRAM TRACING: CPT

## 2021-03-21 RX ORDER — GUAIFENESIN 600 MG
1200 TABLET, EXTENDED RELEASE 12 HR ORAL EVERY 12 HOURS SCHEDULED
Status: DISCONTINUED | OUTPATIENT
Start: 2021-03-21 | End: 2021-03-22 | Stop reason: HOSPADM

## 2021-03-21 RX ADMIN — REMDESIVIR 100 MG: 100 INJECTION, POWDER, LYOPHILIZED, FOR SOLUTION INTRAVENOUS at 20:07

## 2021-03-21 RX ADMIN — ASPIRIN 81 MG: 81 TABLET, CHEWABLE ORAL at 08:32

## 2021-03-21 RX ADMIN — FAMOTIDINE 20 MG: 20 TABLET, FILM COATED ORAL at 08:32

## 2021-03-21 RX ADMIN — PRAVASTATIN SODIUM 40 MG: 40 TABLET ORAL at 17:07

## 2021-03-21 RX ADMIN — Medication 1 TABLET: at 08:32

## 2021-03-21 RX ADMIN — Medication 2000 UNITS: at 08:32

## 2021-03-21 RX ADMIN — CALCIUM 1 TABLET: 500 TABLET ORAL at 08:32

## 2021-03-21 RX ADMIN — RIVAROXABAN 15 MG: 15 TABLET, FILM COATED ORAL at 08:33

## 2021-03-21 RX ADMIN — CARVEDILOL 9.38 MG: 6.25 TABLET, FILM COATED ORAL at 17:07

## 2021-03-21 RX ADMIN — OXYCODONE HYDROCHLORIDE AND ACETAMINOPHEN 1000 MG: 500 TABLET ORAL at 20:07

## 2021-03-21 RX ADMIN — GUAIFENESIN 1200 MG: 600 TABLET, EXTENDED RELEASE ORAL at 20:07

## 2021-03-21 RX ADMIN — ZINC SULFATE 220 MG (50 MG) CAPSULE 220 MG: CAPSULE at 08:33

## 2021-03-21 RX ADMIN — OXYCODONE HYDROCHLORIDE AND ACETAMINOPHEN 1000 MG: 500 TABLET ORAL at 08:32

## 2021-03-21 NOTE — UTILIZATION REVIEW
Initial Clinical Review    Admission: Date/Time/Statement:   Admission Orders (From admission, onward)     Ordered        03/20/21 1822  Inpatient Admission  Once                   Orders Placed This Encounter   Procedures    Inpatient Admission     Standing Status:   Standing     Number of Occurrences:   1     Order Specific Question:   Level of Care     Answer:   Med Surg [16]     Order Specific Question:   Estimated length of stay     Answer:   More than 2 Midnights     Order Specific Question:   Certification     Answer:   I certify that inpatient services are medically necessary for this patient for a duration of greater than two midnights  See H&P and MD Progress Notes for additional information about the patient's course of treatment  ED Arrival Information     Expected Arrival Acuity Means of Arrival Escorted By Service Admission Type    3/20/2021  3/20/2021 14:55 Urgent Walk-In Family Member General Medicine Urgent    Arrival Complaint    Fatigue, unspecified type        Chief Complaint   Patient presents with    Fatigue     C/o cough, fatigue, sore throat, diarrhea, nausea starting last week  Denies fevers   Cough     Assessment/Plan: 79 yo female to ED from home w/ cough , fatigue and weakness  Started 3 weeks ago w/ diarrhea lasting 7-10 days   Lightheaded   Poor po intake and minimal appetite   In ED tested + for COVID , O2 sat 96 % on RA   CXR w/ volume overload  Admitted IP status plan Famotidine, Atorvastatin, Vitamin D3, Vitamin C, Zinc x 7 days then multivitamin qd, remdesevir , xarelto , trend labs , self proning   HTN cont coreg and prn labetalol   hyponatremia 131 recheck in am       3/21 IM Note   Cont medications and monitor   Self proning as able   IS   Trend labs   Monitor BP    Cont coreg     ED Triage Vitals [03/20/21 1507]   Temperature Pulse Respirations Blood Pressure SpO2   98 5 °F (36 9 °C) (!) 108 20 128/69 97 %      Temp Source Heart Rate Source Patient Position - Orthostatic VS BP Location FiO2 (%)   Oral Monitor Sitting Left arm --      Pain Score       No Pain          Wt Readings from Last 1 Encounters:   03/20/21 70 3 kg (155 lb)     Additional Vital Signs:   03/21/21 1500  --  96  16  107/60  79  96 %  None (Room air)  Sitting   03/21/21 0900  --  --  --  103/53  --  --  --  --   03/21/21 0802  98 3 °F (36 8 °C)  93  20  90/55  81  96 %  None (Room air)  Sitting   03/21/21 0000  --  96  18  114/64  84  96 %  None (Room          03/21/21 0000  --  96  18  114/64  84  96 %  None (Room air)  Lying   03/20/21 2100  --  98  18  163/84  106  95 %  None (Room air)  --   03/20/21 2000  --  118Abnormal   20  162/97  116  93 %  None (Room air)  --   03/20/21 1800  --  114Abnormal   20  178/99Abnormal   132  96 %  None (Room air)  Lying   03/20/21 1755  --  120Abnormal   --  --  --  --  --  --   03/20/21 1713  --  112Abnormal   20  172/77Abnormal   --  95 %  None (Room air)  Sitting   03/20/21 1609  --  --  --  --  --  --  None (Room air)         Pertinent Labs/Diagnostic Test Results:   3/20 PCXR No acute cardiopulmonary disease  Results from last 7 days   Lab Units 03/20/21  1614   SARS-COV-2  Positive*     Results from last 7 days   Lab Units 03/21/21  0444 03/20/21  1614   WBC Thousand/uL 5 40 5 39   HEMOGLOBIN g/dL 13 7 14 2   HEMATOCRIT % 40 5 42 8   PLATELETS Thousands/uL 163 159   NEUTROS ABS Thousands/µL 3 08 3 47     Results from last 7 days   Lab Units 03/21/21  0444 03/20/21  1614   SODIUM mmol/L 131* 131*   POTASSIUM mmol/L 4 0 4 5   CHLORIDE mmol/L 99* 95*   CO2 mmol/L 22 28   ANION GAP mmol/L 10 8   BUN mg/dL 19 20   CREATININE mg/dL 1 18 1 39*   EGFR ml/min/1 73sq m 40 32   CALCIUM mg/dL 8 5 9 1     Results from last 7 days   Lab Units 03/21/21  0444 03/20/21  1614   AST U/L 44 42   ALT U/L 28 34   ALK PHOS U/L 61 64   TOTAL PROTEIN g/dL 7 5 8 0   ALBUMIN g/dL 3 1* 3 3*   TOTAL BILIRUBIN mg/dL 0 35 0 41     Results from last 7 days   Lab Units 03/21/21  0444 03/20/21  1614   GLUCOSE RANDOM mg/dL 102 114     Results from last 7 days   Lab Units 03/20/21  1614   OSMOLALITY, SERUM mmol/*     Results from last 7 days   Lab Units 03/20/21  1614   CK TOTAL U/L 140   CK MB INDEX % <1 0   CK MB ng/mL 1 3     Results from last 7 days   Lab Units 03/20/21  1614   TROPONIN I ng/mL <0 02     Results from last 7 days   Lab Units 03/21/21  0443   D-DIMER QUANTITATIVE ug/ml FEU 0 36     Results from last 7 days   Lab Units 03/20/21  1614   TSH 3RD GENERATON uIU/mL 4 064*     Results from last 7 days   Lab Units 03/20/21  1614   NT-PRO BNP pg/mL 978*     Results from last 7 days   Lab Units 03/20/21  1614   FERRITIN ng/mL 380     Results from last 7 days   Lab Units 03/21/21  0444 03/20/21  1614   CRP mg/L 44 9* 41 5*     Results from last 7 days   Lab Units 03/20/21  1901   SODIUM UR  34     Results from last 7 days   Lab Units 03/20/21  1614   INFLUENZA A PCR  Negative   INFLUENZA B PCR  Negative   RSV PCR  Negative         ED Treatment:   Medication Administration from 03/20/2021 1443 to 03/21/2021 0056       Date/Time Order Dose Route Action     03/20/2021 2033 ascorbic acid (VITAMIN C) tablet 1,000 mg 1,000 mg Oral Given     03/20/2021 2010 remdesivir (Veklury) 200 mg in sodium chloride 0 9 % 250 mL IVPB 200 mg Intravenous New Bag     03/20/2021 2034 Labetalol HCl (NORMODYNE) injection 10 mg 10 mg Intravenous Given     03/20/2021 2111 carvedilol (COREG) tablet 9 375 mg 9 375 mg Oral Given     03/20/2021 2125 rivaroxaban (XARELTO) tablet 15 mg 15 mg Oral Given        Past Medical History:   Diagnosis Date    Hyperlipidemia     Hypertension      Present on Admission:   Hypertension   Hyperlipidemia   Permanent atrial fibrillation ([facility] [address] )   Hypertensive urgency      Admitting Diagnosis: Fatigue [R53 83]  Tachycardia [R00 0]  Generalized weakness [R53 1]  Postural dizziness with presyncope [R42, R55]  COVID-19 [U07 1]  Age/Sex: 80 y o  female  Admission Orders:  Scheduled Medications:  ascorbic acid, 1,000 mg, Oral, Q12H CHERRIE  aspirin, 81 mg, Oral, Daily  calcium carbonate, 1 tablet, Oral, Daily With Breakfast  carvedilol, 9 375 mg, Oral, BID With Meals  cholecalciferol, 2,000 Units, Oral, Daily  famotidine, 20 mg, Oral, Daily  multivitamin-minerals, 1 tablet, Oral, Daily  pravastatin, 40 mg, Oral, Daily With Dinner  remdesivir, 100 mg, Intravenous, Q24H  rivaroxaban, 15 mg, Oral, Daily With Breakfast  zinc sulfate, 220 mg, Oral, Daily      Continuous IV Infusions:     PRN Meds:  Labetalol HCl, 10 mg, Intravenous, Q4H PRN    Tele       Network Utilization Review Department  ATTENTION: Please call with any questions or concerns to [phone] and carefully listen to the prompts so that you are directed to the right person  All voicemails are confidential   Angeline Muss all requests for admission clinical reviews, approved or denied determinations and any other requests to dedicated fax number below belonging to the campus where the patient is receiving treatment   List of dedicated fax numbers for the Facilities:  [address] DENIALS (Administrative/Medical Necessity) [phone]   [address] (Maternity/NICU/Pediatrics) [phone] [address] Hussein Valladares 8233 (  Octavio Mariee "Sara" 103) [address] [place] [address] Rubin Enrique [address] [place], [place] [address] [phone]

## 2021-03-21 NOTE — ED NOTES
RN called pt's daughter back that requested an update earlier this evening  Pt's daughter did not answer, left call back number in voicemail        Ines Tijerina RN  03/20/21 9205

## 2021-03-21 NOTE — PROGRESS NOTES
[facility]  Progress Note - [address] Kasandra Rivera 11/21/1926, 80 y o  female MRN: [id number]  Unit/Bed#: S -01 Encounter: [id number]  Primary Care Provider: YISSEL Carlson   Date and time admitted to hospital: 3/20/2021  3:14 PM    * COVID-19  Assessment & Plan  Lab Results   Component Value Date    SARSCOV2 Positive (A) 03/20/2021     · Patient presents with diarrhea that began approximately 3 weeks ago, but resolved after 7-10 days and a 1 5 week history of cough with worsening fatigue and weakness  · SpO2 96% on room air  · Chest x-ray appears to show volume overload  · Mild COVID    Plan:  · Famotidine, Atorvastatin, Vitamin D3, Vitamin C, Zinc x 7 days then multivitamin qd  · Remdesevir Day 2/5  · Therapeutic Anticoagulation with Xarelto  · PT and OT eval and treat, Self-proning if able, Incentive spirometry  · Trend labs as needed  · Follow-up chest x-ray    Hyponatremia  Assessment & Plan  · Patient remains hyponatremic at 131  · Likely secondary to poor p o  Intake, she says her appetite has been poor at home, but that she has been drinking 6-8 glasses of water daily  · Low serum osmolality, Urine OSM at 324, Urine Na 24    Plan:  · Follow-up morning BMP  · Encourage diet    Generalized weakness  Assessment & Plan  · Patient presents with generalized weakness at home likely secondary to diarrhea and COVID    Plan:  · PT/OT eval and treat    Permanent atrial fibrillation (HCC)  Assessment & Plan  · Patient has history of atrial fibrillation, on Coreg 9 375 mg b i d  And Xarelto 20 mg daily  · On Tely, HR < 110    Plan:  · Continue home medications  · P r n  Labetalol for hypertension and tachycardia    Hypertensive urgency  Assessment & Plan  · Patient presents with elevated blood pressures to 194/97 in the emergency room  · She has history of hypertension on Coreg 9 375 mg b i d  At home  · Patient denies headaches and vision changes    Plan:  · Continue home Coreg  · P r n  Labetalol    Hyperlipidemia  Assessment & Plan  · Patient has history of hyperlipidemia, on simvastatin 20 mg daily at home    Plan:  · Continue with pravastatin 40 mg daily    Hypertension  Assessment & Plan  · Patient has history of hypertension, on Coreg 9 375 mg b i d  At home  · Blood pressure is elevated to 178/99 on admission  Currently acceptable  Plan:  · Continue home Coreg  · IV Labetalol p r n  For persistent tachycardia and hypertension      VTE Pharmacologic Prophylaxis:   Pharmacologic: Rivaroxaban (Xarelto)  Mechanical VTE Prophylaxis in Place: No    Discussions with Specialists or Other Care Team Provider: IM attending    Education and Discussions with Family / Patient: Discussed with patient at bedside  Will update contact later during the day  Current Length of Stay: 1 day(s)    Current Patient Status: Inpatient     Discharge Plan / Estimated Discharge Date: Anticipate within the next 24 hours    Code Status: Level 3 - DNAR and DNI      Subjective:   Patient was alert and awake, seated at her recliner having breakfast  She did not have any complaints  Objective:     Vitals:   Temp (24hrs), Av 2 °F (36 8 °C), Min:97 8 °F (36 6 °C), Max:98 5 °F (36 9 °C)    Temp:  [97 8 °F (36 6 °C)-98 5 °F (36 9 °C)] 98 3 °F (36 8 °C)  HR:  [] 93  Resp:  [18-20] 20  BP: ()/(53-99) 103/53  SpO2:  [93 %-97 %] 96 %  There is no height or weight on file to calculate BMI  Input and Output Summary (last 24 hours): Intake/Output Summary (Last 24 hours) at 3/21/2021 1300  Last data filed at 3/21/2021 0900  Gross per 24 hour   Intake 510 ml   Output 500 ml   Net 10 ml       Physical Exam:     Physical Exam  Constitutional:       General: She is awake  She is not in acute distress  Appearance: She is overweight  HENT:      Head: Normocephalic and atraumatic        Nose: Nose normal       Mouth/Throat:      Mouth: Mucous membranes are moist    Eyes:      Extraocular Movements: Extraocular movements intact  Cardiovascular:      Rate and Rhythm: Normal rate  Rhythm regularly irregular  Pulses: Normal pulses  Heart sounds: Normal heart sounds  No murmur  No friction rub  Pulmonary:      Effort: Pulmonary effort is normal  No respiratory distress  Breath sounds: Normal breath sounds  Chest:      Chest wall: No tenderness  Abdominal:      General: Bowel sounds are normal  There is no distension  Palpations: Abdomen is soft  Tenderness: There is no abdominal tenderness  Musculoskeletal: Normal range of motion  General: No swelling, tenderness or deformity  Right lower leg: No edema  Left lower leg: No edema  Skin:     General: Skin is warm  Findings: No erythema, lesion or rash  Neurological:      Mental Status: She is alert and oriented to person, place, and time  Psychiatric:         Mood and Affect: Mood normal          Behavior: Behavior normal          Additional Data:     Labs:    Results from last 7 days   Lab Units 03/21/21  0444   WBC Thousand/uL 5 40   HEMOGLOBIN g/dL 13 7   HEMATOCRIT % 40 5   PLATELETS Thousands/uL 163   NEUTROS PCT % 57   LYMPHS PCT % 32   MONOS PCT % 10   EOS PCT % 0     Results from last 7 days   Lab Units 03/21/21  0444   POTASSIUM mmol/L 4 0   CHLORIDE mmol/L 99*   CO2 mmol/L 22   BUN mg/dL 19   CREATININE mg/dL 1 18   CALCIUM mg/dL 8 5   ALK PHOS U/L 61   ALT U/L 28   AST U/L 44           * I Have Reviewed All Lab Data Listed Above  * Additional Pertinent Lab Tests Reviewed:  Leeanne 66 Admission Reviewed    Imaging:    Imaging Reports Reviewed Today Include: None  Imaging Personally Reviewed by Myself Includes:  None    Recent Cultures (last 7 days):           Last 24 Hours Medication List:   Current Facility-Administered Medications   Medication Dose Route Frequency Provider Last Rate    ascorbic acid  1,000 mg Oral Q12H Mica Yuen MD      aspirin  81 mg Oral Daily Jaida Mars MD      calcium carbonate  1 tablet Oral Daily With Breakfast Jaida Mars MD      carvedilol  9 375 mg Oral BID With Meals Jaida Mars MD      cholecalciferol  2,000 Units Oral Daily Jaida Mars MD      famotidine  20 mg Oral Daily Jaida Mars MD      Labetalol HCl  10 mg Intravenous Q4H PRN Jaida Mars MD      multivitamin-minerals  1 tablet Oral Daily Jaida Mars MD      pravastatin  40 mg Oral Daily With Maya Diana MD      remdesivir  100 mg Intravenous Q24H Jaida Mars MD      rivaroxaban  15 mg Oral Daily With Breakfast Jaida Mars MD      zinc sulfate  220 mg Oral Daily Jaida Mars MD          Today, Patient Was Seen By: Selin Chen MD    ** Please Note: This note has been constructed using a voice recognition system   **

## 2021-03-21 NOTE — PLAN OF CARE
Problem: Potential for Falls  Goal: Patient will remain free of falls  Description: INTERVENTIONS:  - Assess patient frequently for physical needs  -  Identify cognitive and physical deficits and behaviors that affect risk of falls    -  [place] fall precautions as indicated by assessment   - Educate patient/family on patient safety including physical limitations  - Instruct patient to call for assistance with activity based on assessment  - Modify environment to reduce risk of injury  - Consider OT/PT consult to assist with strengthening/mobility  Outcome: Progressing     Problem: CARDIOVASCULAR - ADULT  Goal: Maintains optimal cardiac output and hemodynamic stability  Description: INTERVENTIONS:  - Monitor I/O, vital signs and rhythm  - Monitor for S/S and trends of decreased cardiac output  - Administer and titrate ordered vasoactive medications to optimize hemodynamic stability  - Assess quality of pulses, skin color and temperature  - Assess for signs of decreased coronary artery perfusion  - Instruct patient to report change in severity of symptoms  Outcome: Progressing  Goal: Absence of cardiac dysrhythmias or at baseline rhythm  Description: INTERVENTIONS:  - Continuous cardiac monitoring, vital signs, obtain 12 lead EKG if ordered  - Administer antiarrhythmic and heart rate control medications as ordered  - Monitor electrolytes and administer replacement therapy as ordered  Outcome: Progressing     Problem: RESPIRATORY - ADULT  Goal: Achieves optimal ventilation and oxygenation  Description: INTERVENTIONS:  - Assess for changes in respiratory status  - Assess for changes in mentation and behavior  - Position to facilitate oxygenation and minimize respiratory effort  - Oxygen administered by appropriate delivery if ordered  - Initiate smoking cessation education as indicated  - Encourage broncho-pulmonary hygiene including cough, deep breathe, Incentive Spirometry  - Assess the need for suctioning and aspirate as needed  - Assess and instruct to report SOB or any respiratory difficulty  - Respiratory Therapy support as indicated  Outcome: Progressing

## 2021-03-22 VITALS
RESPIRATION RATE: 16 BRPM | DIASTOLIC BLOOD PRESSURE: 59 MMHG | TEMPERATURE: 98.2 F | SYSTOLIC BLOOD PRESSURE: 113 MMHG | OXYGEN SATURATION: 97 % | HEART RATE: 97 BPM

## 2021-03-22 LAB
ANION GAP SERPL CALCULATED.3IONS-SCNC: 9 MMOL/L (ref 4–13)
ATRIAL RATE: 375 BPM
BUN SERPL-MCNC: 26 MG/DL (ref 5–25)
CALCIUM SERPL-MCNC: 8.7 MG/DL (ref 8.3–10.1)
CHLORIDE SERPL-SCNC: 102 MMOL/L (ref 100–108)
CO2 SERPL-SCNC: 23 MMOL/L (ref 21–32)
CREAT SERPL-MCNC: 1.21 MG/DL (ref 0.6–1.3)
ERYTHROCYTE [DISTWIDTH] IN BLOOD BY AUTOMATED COUNT: 12.4 % (ref 11.6–15.1)
GFR SERPL CREATININE-BSD FRML MDRD: 38 ML/MIN/1.73SQ M
GLUCOSE SERPL-MCNC: 93 MG/DL (ref 65–140)
HCT VFR BLD AUTO: 41.7 % (ref 34.8–46.1)
HGB BLD-MCNC: 13.9 G/DL (ref 11.5–15.4)
MCH RBC QN AUTO: 29.6 PG (ref 26.8–34.3)
MCHC RBC AUTO-ENTMCNC: 33.3 G/DL (ref 31.4–37.4)
MCV RBC AUTO: 89 FL (ref 82–98)
PLATELET # BLD AUTO: 168 THOUSANDS/UL (ref 149–390)
PMV BLD AUTO: 10.1 FL (ref 8.9–12.7)
POTASSIUM SERPL-SCNC: 3.8 MMOL/L (ref 3.5–5.3)
PROCALCITONIN SERPL-MCNC: <0.05 NG/ML
QRS AXIS: 18 DEGREES
QRSD INTERVAL: 84 MS
QT INTERVAL: 310 MS
QTC INTERVAL: 413 MS
RBC # BLD AUTO: 4.69 MILLION/UL (ref 3.81–5.12)
SODIUM SERPL-SCNC: 134 MMOL/L (ref 136–145)
T WAVE AXIS: 78 DEGREES
VENTRICULAR RATE: 107 BPM
WBC # BLD AUTO: 4.76 THOUSAND/UL (ref 4.31–10.16)

## 2021-03-22 PROCEDURE — 80048 BASIC METABOLIC PNL TOTAL CA: CPT | Performed by: INTERNAL MEDICINE

## 2021-03-22 PROCEDURE — 84145 PROCALCITONIN (PCT): CPT | Performed by: INTERNAL MEDICINE

## 2021-03-22 PROCEDURE — 93010 ELECTROCARDIOGRAM REPORT: CPT | Performed by: INTERNAL MEDICINE

## 2021-03-22 PROCEDURE — 99239 HOSP IP/OBS DSCHRG MGMT >30: CPT | Performed by: INTERNAL MEDICINE

## 2021-03-22 PROCEDURE — 85027 COMPLETE CBC AUTOMATED: CPT | Performed by: INTERNAL MEDICINE

## 2021-03-22 RX ORDER — CARVEDILOL 3.12 MG/1
9.38 TABLET ORAL 2 TIMES DAILY WITH MEALS
Qty: 180 TABLET | Refills: 0
Start: 2021-03-22

## 2021-03-22 RX ORDER — METOPROLOL TARTRATE 5 MG/5ML
5 INJECTION INTRAVENOUS EVERY 6 HOURS PRN
Status: DISCONTINUED | OUTPATIENT
Start: 2021-03-22 | End: 2021-03-22 | Stop reason: HOSPADM

## 2021-03-22 RX ORDER — ZINC SULFATE 50(220)MG
220 CAPSULE ORAL DAILY
Qty: 5 CAPSULE | Refills: 0 | Status: SHIPPED | OUTPATIENT
Start: 2021-03-23 | End: 2021-03-28

## 2021-03-22 RX ADMIN — CALCIUM 1 TABLET: 500 TABLET ORAL at 08:53

## 2021-03-22 RX ADMIN — RIVAROXABAN 15 MG: 15 TABLET, FILM COATED ORAL at 08:53

## 2021-03-22 RX ADMIN — Medication 1 TABLET: at 08:53

## 2021-03-22 RX ADMIN — GUAIFENESIN 1200 MG: 600 TABLET, EXTENDED RELEASE ORAL at 08:53

## 2021-03-22 RX ADMIN — OXYCODONE HYDROCHLORIDE AND ACETAMINOPHEN 1000 MG: 500 TABLET ORAL at 08:53

## 2021-03-22 RX ADMIN — ZINC SULFATE 220 MG (50 MG) CAPSULE 220 MG: CAPSULE at 08:53

## 2021-03-22 RX ADMIN — FAMOTIDINE 20 MG: 20 TABLET, FILM COATED ORAL at 08:53

## 2021-03-22 RX ADMIN — Medication 2000 UNITS: at 08:53

## 2021-03-22 RX ADMIN — CARVEDILOL 9.38 MG: 6.25 TABLET, FILM COATED ORAL at 08:54

## 2021-03-22 RX ADMIN — ASPIRIN 81 MG: 81 TABLET, CHEWABLE ORAL at 08:53

## 2021-03-22 NOTE — CASE MANAGEMENT
CM received phonecall from daughter Deepthi Tariq who requests that CM arrange transport  Patient is appropriate for [facility] transport  CM placed referral to [facility] via [employer]  Per Kemi Gallagher at [facility], 36 transport time with [facility]  CM notified ZEKE, nurse, daughter Dain Mckeon at [facility]

## 2021-03-22 NOTE — ASSESSMENT & PLAN NOTE
· Hyponatremic to 131 on admission, improved to 134 this morning  · Likely secondary to poor p o   Intake, she says her appetite has been poor at home, but that she has been drinking 6-8 glasses of water daily  · Serum osmolality decreased to 275, Urine OSM at 324, Urine Na 24  · Patient feels that her diet is back to normal    Plan:  · Outpatient follow-up

## 2021-03-22 NOTE — ASSESSMENT & PLAN NOTE
· Patient has history of hypertension, on Coreg 9 375 mg b i d   At home  · Blood pressure is elevated to 178/99 on admission now improved, 113/71 this morning    Plan:  · Continue home Coreg

## 2021-03-22 NOTE — ASSESSMENT & PLAN NOTE
· Patient has history of hyperlipidemia, on simvastatin 20 mg daily at home    Plan:  · Resume home simvastatin

## 2021-03-22 NOTE — ASSESSMENT & PLAN NOTE
· Patient presents with elevated blood pressures to 194/97 in the emergency room  · She has history of hypertension on Coreg 9 375 mg b i d   At home  · Patient denies headaches and vision changes on admission  · Blood pressure 113/71 this morning    Plan:  · Continue home Coreg  · Outpatient follow-up

## 2021-03-22 NOTE — ASSESSMENT & PLAN NOTE
Lab Results   Component Value Date    SARSCOV2 Positive (A) 03/20/2021     · Patient presents with diarrhea that began approximately 3 weeks ago, but resolved after 7-10 days and a 1 5 week history of cough with worsening fatigue and weakness  · Chest x-ray shows no acute cardiopulmonary disease  · SpO2 96% on room air this morning  · Patient received 2 days of remdesivir therapy    Plan:  · Vitamin C, Zinc x 7 days then multivitamin qd  · Therapeutic Anticoagulation with Xarelto  · Outpatient follow-up

## 2021-03-22 NOTE — CASE MANAGEMENT
RUSLAN called daughter William Mendez at both her home and cell phone numbers, left message requesting callback to discuss discharge planning

## 2021-03-22 NOTE — DISCHARGE INSTR - AVS FIRST PAGE
Dear Jose Elizabeth,     It was our pleasure to care for you here at [facility]  It is our hope that we were always able to exceed the expected standards for your care during your stay  You were hospitalized due to COVID-19 with rapid atrial fibrillation and elevated blood pressures  You were cared for on the 3rd floor by Kayla Oakley MD under the service of Nigel Franco,  with the [facility] who covers for your primary care physician (PCP), YISSEL Langston, while you were hospitalized  If you have any questions or concerns related to this hospitalization, you may contact us at [phone]  For follow up as well as any medication refills, we recommend that you follow up with your primary care physician  A registered nurse will reach out to you by phone within a few days after your discharge to answer any additional questions that you may have after going home  However, at this time we provide for you here, the most important instructions / recommendations at discharge:     · Notable Medication Adjustments -   · Vitamin-C 1000 mg twice daily for total of 7 days  · Zinc 220 mg daily for a total of 7 days  · Mucinex twice daily  · Multi-vitamin after completion of vitamin-C and zinc course  · Testing Required after Discharge -   · None  · Important follow up information -   · PCP  · Other Instructions -   · Please quarantine for a total of 2 weeks after your initial COVID test, until April 3rd  · Please speak with your PCP regarding COVID vaccination; current CDC recommendations say that delaying COVID vaccination up to 90 days after infection is acceptable  She could also begin receiving immunization when her quarantine period is up after April 3rd    This should be discussed further with the patient and her PCP  · Please review this entire after visit summary as additional general instructions including medication list, appointments, activity, diet, any pertinent wound care, and other additional recommendations from your care team that may be provided for you        Sincerely,     James Reyes MD

## 2021-03-22 NOTE — ASSESSMENT & PLAN NOTE
· Patient has history of atrial fibrillation, on Coreg 9 375 mg b i d   And Xarelto 20 mg daily  · Rate controlled this morning    Plan:  · Continue home medications  · Outpatient follow-up

## 2021-03-22 NOTE — ASSESSMENT & PLAN NOTE
· Patient presents with generalized weakness at home likely secondary to diarrhea and COVID  · Patient at baseline this morning    Plan:  · Outpatient follow-up

## 2021-03-22 NOTE — PLAN OF CARE
Problem: Potential for Falls  Goal: Patient will remain free of falls  Description: INTERVENTIONS:  - Assess patient frequently for physical needs  -  Identify cognitive and physical deficits and behaviors that affect risk of falls    -  [place] fall precautions as indicated by assessment   - Educate patient/family on patient safety including physical limitations  - Instruct patient to call for assistance with activity based on assessment  - Modify environment to reduce risk of injury  - Consider OT/PT consult to assist with strengthening/mobility  Outcome: Progressing     Problem: CARDIOVASCULAR - ADULT  Goal: Maintains optimal cardiac output and hemodynamic stability  Description: INTERVENTIONS:  - Monitor I/O, vital signs and rhythm  - Monitor for S/S and trends of decreased cardiac output  - Administer and titrate ordered vasoactive medications to optimize hemodynamic stability  - Assess quality of pulses, skin color and temperature  - Assess for signs of decreased coronary artery perfusion  - Instruct patient to report change in severity of symptoms  Outcome: Progressing  Goal: Absence of cardiac dysrhythmias or at baseline rhythm  Description: INTERVENTIONS:  - Continuous cardiac monitoring, vital signs, obtain 12 lead EKG if ordered  - Administer antiarrhythmic and heart rate control medications as ordered  - Monitor electrolytes and administer replacement therapy as ordered  Outcome: Progressing     Problem: RESPIRATORY - ADULT  Goal: Achieves optimal ventilation and oxygenation  Description: INTERVENTIONS:  - Assess for changes in respiratory status  - Assess for changes in mentation and behavior  - Position to facilitate oxygenation and minimize respiratory effort  - Oxygen administered by appropriate delivery if ordered  - Initiate smoking cessation education as indicated  - Encourage broncho-pulmonary hygiene including cough, deep breathe, Incentive Spirometry  - Assess the need for suctioning and aspirate as needed  - Assess and instruct to report SOB or any respiratory difficulty  - Respiratory Therapy support as indicated  Outcome: Progressing

## 2021-03-22 NOTE — UTILIZATION REVIEW
Notification of Inpatient Admission/Inpatient Authorization Request   This is a Notification of Inpatient Admission for [facility]  Be advised that this patient was admitted to our facility under Inpatient Status  Contact Cait Metz at [phone] for additional admission information  [facility] DEPT  DEDICATED -[phone]  Patient Name:   Anil Dueñas   YOB: 1926       [address]   [address]:   Arun Mulligan  Tax ID: [id number]  NPI: [id number] Attending Provider/NPI:  Address:  Phone: Alaina Hubbard Do [[id number]]  Same as the facility  [phone]   Place of Service Code: 24 Place of Service Name:  [address] [place] [address]   Start Date: 3/20/21 1818     Discharge Date & Time: No discharge date for patient encounter  Type of Admission: Inpatient Status Discharge Disposition   (if discharged): Home/Self Care   Patient Diagnoses: Fatigue [R53 83]  Tachycardia [R00 0]  Generalized weakness [R53 1]  Postural dizziness with presyncope [R42, R55]  COVID-19 [U07 1]     Orders: Admission Orders (From admission, onward)     Ordered        03/20/21 1822  Inpatient Admission  Once                    Assigned Utilization Review Contact: Cait Metz  Utilization   Network Utilization Review Department  Phone: [phone]; Fax [phone]  Email: [email or web address]   ATTENTION PAYERS: Please call the assigned Utilization  directly with any questions or concerns ALL voicemails in the department are confidential  Send all requests for admission clinical reviews, approved or denied determinations and any other requests to dedicated fax number belonging to the campus where the patient is receiving treatment

## 2021-03-22 NOTE — CASE MANAGEMENT
LOS 2, patient is not a bundle, patient is not a 30 day readmission  Unplanned Readmission Risk Score: 12    Patient is Covid-19+  CM spoke to daughter Jason Troncoso at [phone] to discuss discharge planning  CM name and role introduced  Daughter Jason Troncoso reports the following: Patient lives in [address] [place] [address] and is self-sufficient and independent with her ADLs, does not use any DME, no steps to enter, this facility and daughter provide transportation, patient fills her prescriptions via mail order, the facility provides meals in the dining jesus, denies history of post-acute rehab, denies history of VNA, denies mental health problems or drug&alcohol problems  Daughter reports that patient is friendly and active and has a lot of support within this community between staff and neighbors  CM offered to set up home healthcare but daughter does not believe that it is necessary at this time given the support that patient has in this community and her self-sufficient status  Daughter provided name of the Director at [address] [place] [address]  CM called Shira Hernández at [address] [place] [address] at [phone]  Shira Hernández reports that everyone in the building with the exception of 2 residents have been vaccinated  Patient had not yet been vaccinated due to being ill during both scheduled vaccination dates, and patient has been the positive case at [address] [place] [address]  Director Shira Hernández reports that patient is welcome to return, the facility provides meals and housecleaning, patient is independent with her ADLs and was walking to the dining jesus for meals however upon return she will be quarantined and receive her meals in her room  Bea uses a visiting nurse from [employer] and has PT through [facility] if the patient were to need it and had a doctor's prescription  Shira Arm of [address] [place] [address] states that there will be "lots of eyes on her" upon her return    RUSLAN offered to arrange [facility] transport but daughter chooses to provide transportation back to [facility]  CM reviewed discharge planning process including the following: identifying caregivers at home, preference for d/c planning needs, availability of treatment team to discuss questions or concerns patient and/or family may have regarding diagnosis, plan of care, old or new medications and discharge planning   CM will continue to follow for care coordination and update assessment as necessary

## 2021-03-22 NOTE — DISCHARGE SUMMARY
[facility]  Discharge- [address]  11/21/1926, 80 y o  female MRN: [id number]  Unit/Bed#: S -01 Encounter: [id number]  Primary Care Provider: YISSEL Yin   Date and time admitted to hospital: 3/20/2021  3:14 PM    * COVID-19  Assessment & Plan  Lab Results   Component Value Date    SARSCOV2 Positive (A) 03/20/2021     · Patient presents with diarrhea that began approximately 3 weeks ago, but resolved after 7-10 days and a 1 5 week history of cough with worsening fatigue and weakness  · Chest x-ray shows no acute cardiopulmonary disease  · SpO2 96% on room air this morning  · Patient received 2 days of remdesivir therapy    Plan:  · Vitamin C, Zinc x 7 days then multivitamin qd  · Therapeutic Anticoagulation with Xarelto  · Outpatient follow-up    Hypertensive urgency  Assessment & Plan  · Patient presents with elevated blood pressures to 194/97 in the emergency room  · She has history of hypertension on Coreg 9 375 mg b i d  At home  · Patient denies headaches and vision changes on admission  · Blood pressure 113/71 this morning    Plan:  · Continue home Coreg  · Outpatient follow-up    Permanent atrial fibrillation [facility])  Assessment & Plan  · Patient has history of atrial fibrillation, on Coreg 9 375 mg b i d  And Xarelto 20 mg daily  · Rate controlled this morning    Plan:  · Continue home medications  · Outpatient follow-up    Generalized weakness  Assessment & Plan  · Patient presents with generalized weakness at home likely secondary to diarrhea and COVID  · Patient at baseline this morning    Plan:  · Outpatient follow-up    Hyponatremia  Assessment & Plan  · Hyponatremic to 131 on admission, improved to 134 this morning  · Likely secondary to poor p o   Intake, she says her appetite has been poor at home, but that she has been drinking 6-8 glasses of water daily  · Serum osmolality decreased to 275, Urine OSM at 324, Urine Na 24  · Patient feels that her diet is back to normal    Plan:  · Outpatient follow-up    Hypertension  Assessment & Plan  · Patient has history of hypertension, on Coreg 9 375 mg b i d  At home  · Blood pressure is elevated to 178/99 on admission now improved, 113/71 this morning    Plan:  · Continue home Coreg    Hyperlipidemia  Assessment & Plan  · Patient has history of hyperlipidemia, on simvastatin 20 mg daily at home    Plan:  · Resume home simvastatin        Discharging Resident Physician: Samuel Ly MD  Attending: Poppy Winter DO  PCP: YISSEL Ritter  Admission Date: 3/20/2021  Discharge Date: 03/22/21    Disposition:     Home    Reason for Admission: COVID-19    Consultations During Hospital Stay:  · None    Procedures Performed:     · CXR    Significant Findings / Test Results:     · None    Incidental Findings:   · None     Test Results Pending at Discharge (will require follow up): · None     Outpatient Tests Requested:  · None    Complications:  None    Hospital Course:     Franci Espinosa is a 80 y o  female patient who originally presented to the hospital on 3/20/2021 due to diarrhea that resolved prior to admission, but a subsequent unrelenting cough with lightheadedness, fatigue, and poor p o  Intake  Upon arrival to the emergency department she was found to be COVID positive  She was also found to be in rapid atrial fibrillation and in hypertensive urgency with pressures to the 733 systolic  She was given IV labetalol which improved her blood pressures and heart rates  She was also hyponatremic to 131  She was started on the mild COVID pathway  Her sodium improved to 134 this morning  She did not require any supplemental oxygen  Her heart rates and blood pressures have also improved and have been stable  She began to have improvement of her symptoms, and has now been tolerating diet, and feels back at her baseline  She is ready for discharge today      Condition at Discharge: good     Discharge Day Visit / Exam: Subjective:  No acute overnight events  Patient is sitting up comfortably in the chair  She denies chest pain and shortness of breath  She is currently tolerating diet without nausea, vomiting, and diarrhea  She feels prepared for discharge  Vitals: Blood Pressure: 113/59 (03/22/21 0736)  Pulse: 97 (03/22/21 0736)  Temperature: 98 2 °F (36 8 °C) (03/22/21 0736)  Temp Source: Oral (03/22/21 0736)  Respirations: 16 (03/22/21 0736)  SpO2: 97 % (03/22/21 0736)  Exam:   Physical Exam  Vitals signs and nursing note reviewed  Constitutional:       General: She is awake  She is not in acute distress  Appearance: Normal appearance  She is overweight  She is not ill-appearing, toxic-appearing or diaphoretic  HENT:      Head: Normocephalic and atraumatic  Nose: Nose normal       Mouth/Throat:      Mouth: Mucous membranes are moist       Pharynx: No oropharyngeal exudate  Eyes:      General: No scleral icterus  Right eye: No discharge  Left eye: No discharge  Conjunctiva/sclera: Conjunctivae normal    Cardiovascular:      Rate and Rhythm: Normal rate  Rhythm irregular  Pulses: Normal pulses  Heart sounds: Normal heart sounds  No murmur  No friction rub  Pulmonary:      Effort: Pulmonary effort is normal  No respiratory distress  Breath sounds: Normal breath sounds  No wheezing, rhonchi or rales  Chest:      Chest wall: No tenderness  Abdominal:      General: Bowel sounds are normal  There is no distension  Palpations: Abdomen is soft  Tenderness: There is no abdominal tenderness  Musculoskeletal: Normal range of motion  General: No swelling, tenderness or deformity  Right lower leg: No edema  Left lower leg: No edema  Skin:     General: Skin is warm  Findings: No erythema, lesion or rash  Neurological:      General: No focal deficit present  Mental Status: She is alert and oriented to person, place, and time  Psychiatric:         Mood and Affect: Mood normal          Behavior: Behavior normal        Discussion with Family:  I discussed case with the patient at bedside and updated her daughter over the phone regarding the plan for transport at 4:30 p m  Back to her facility  Discharge instructions/Information to patient and family:   See after visit summary for information provided to patient and family  Provisions for Follow-Up Care:  See after visit summary for information related to follow-up care and any pertinent home health orders  Planned Readmission: No     Discharge Medications:  See after visit summary for reconciled discharge medications provided to patient and family        ** Please Note: This note has been constructed using a voice recognition system **

## 2021-03-24 NOTE — PROGRESS NOTES
[facility]        NAME: Juntio Avila is a 80 y o  female  : 1926    MRN: [id number]  DATE: 2021  TIME: 7:24 PM    Assessment and Plan   Fatigue, unspecified type [R53 83]  1  Fatigue, unspecified type  Transfer to other facility   2  Cough  Transfer to other facility         Patient Instructions       Follow up with PCP in 3-5 days  Proceed to  ER if symptoms worsen  Chief Complaint     Chief Complaint   Patient presents with   [address]     Per daughter, pt is extremely weak and fatigued, has a cough and is nauseous x1 week  She had a "collapse" yesterday, per her daughter  Denies any COVID contact  History of Present Illness       Fatigue  This is a new problem  The current episode started in the past 7 days  The problem occurs constantly  The problem has been unchanged  Associated symptoms include coughing, fatigue, nausea and vertigo  Associated symptoms comments: diarrhea  Nothing aggravates the symptoms  She has tried nothing for the symptoms  The treatment provided no relief  Review of Systems   Review of Systems   Constitutional: Positive for fatigue  HENT: Negative  Eyes: Negative  Respiratory: Positive for cough  Cardiovascular: Negative  Gastrointestinal: Positive for diarrhea and nausea  Neurological: Positive for vertigo           Current Medications       Current Outpatient Medications:     aspirin 81 mg chewable tablet, Chew 81 mg daily, Disp: , Rfl:     calcium carbonate (OS-ESTEBAN) 600 MG tablet, Take 600 mg by mouth daily, Disp: , Rfl:     cholecalciferol (VITAMIN D3) 1,000 units tablet, Take 1,000 Units by mouth daily, Disp: , Rfl:     loratadine (CLARITIN) 10 mg tablet, Daily, Disp: , Rfl:     Multiple Vitamins-Minerals (MULTIVITAMIN ADULT PO), Take 1 tablet by mouth daily, Disp: , Rfl:     Psyllium (METAMUCIL PO), Take by mouth daily, Disp: , Rfl:     simvastatin (ZOCOR) 20 mg tablet, 20 mg daily , Disp: , Rfl:    XARELTO 20 MG tablet, daily with breakfast , Disp: , Rfl:     ascorbic acid (VITAMIN C) 1000 MG tablet, Take 1 tablet (1,000 mg total) by mouth every 12 (twelve) hours for 10 doses, Disp: 10 tablet, Rfl: 0    Blood Pressure Monitoring (ADULT BLOOD PRESSURE CUFF LG) KIT, by Does not apply route once for 1 dose, Disp: 1 each, Rfl: 0    carvedilol (COREG) 3 125 mg tablet, Take 3 tablets (9 375 mg total) by mouth 2 (two) times a day with meals Coreg 3 125mg BID in addition to Coreg 6 25mg BID, Disp: 180 tablet, Rfl: 0    zinc sulfate (ZINCATE) 220 mg capsule, Take 1 capsule (220 mg total) by mouth daily for 5 doses, Disp: 5 capsule, Rfl: 0    Current Allergies     Allergies as of 03/20/2021    (No Known Allergies)            The following portions of the patient's history were reviewed and updated as appropriate: allergies, current medications, past family history, past medical history, past social history, past surgical history and problem list      Past Medical History:   Diagnosis Date    Hyperlipidemia     Hypertension        Past Surgical History:   Procedure Laterality Date    BREAST CYST EXCISION Right 1984    benign    HYSTERECTOMY      age 44       History reviewed  No pertinent family history  Medications have been verified  Objective   /80 (BP Location: Left arm, Patient Position: Sitting)   Pulse (!) 125   Temp 97 8 °F (36 6 °C) (Temporal)   Resp 20   Ht 5' 1" (1 549 m)   Wt 70 3 kg (155 lb)   SpO2 96%   BMI 29 29 kg/m²        Physical Exam     Physical Exam  Vitals signs and nursing note reviewed  Constitutional:       Appearance: Normal appearance  She is well-developed  HENT:      Head: Normocephalic and atraumatic  Nose: Nose normal       Mouth/Throat:      Mouth: Mucous membranes are moist    Eyes:      Extraocular Movements: Extraocular movements intact  Pupils: Pupils are equal, round, and reactive to light     Cardiovascular:      Rate and Rhythm: Normal rate and regular rhythm  Pulses: Normal pulses  Heart sounds: Normal heart sounds  Pulmonary:      Effort: Pulmonary effort is normal       Breath sounds: Normal breath sounds  Abdominal:      General: Abdomen is flat  Palpations: Abdomen is soft  Neurological:      Mental Status: She is alert

## 2021-03-24 NOTE — UTILIZATION REVIEW
Notification of Discharge  This is a Notification of Discharge from our facility [address]  Please be advised that this patient has been discharge from our facility  Below you will find the admission and discharge date and time including the patients disposition  PRESENTATION DATE: 3/20/2021  3:14 PM  OBS ADMISSION DATE:   IP ADMISSION DATE: 3/20/21 1818   DISCHARGE DATE: 3/22/2021  5:47 PM  DISPOSITION: Home/Self Care Home/Self Care   Admission Orders listed below:  Admission Orders (From admission, onward)     Ordered        03/20/21 1822  Inpatient Admission  Once                   Please contact the UR Department if additional information is required to close this patient's authorization/case  [address] Utilization Review Department  Main: [phone] x carefully listen to the prompts  All voicemails are confidential   [email or web address]  Send all requests for admission clinical reviews, approved or denied determinations and any other requests to dedicated fax number below belonging to the campus where the patient is receiving treatment   List of dedicated fax numbers:  [address] DENIALS (Administrative/Medical Necessity) [phone]   [address] (Maternity/NICU/Pediatrics) [phone]   [facility] [phone]   [facility] [phone]   [facility] [phone]   [facility] [address] [phone]   [facility]  [phone]   [address] [facility]  [address] [address] [address] [facility] [phone]

## 2021-03-29 LAB
ATRIAL RATE: 98 BPM
QRS AXIS: -11 DEGREES
QRSD INTERVAL: 82 MS
QT INTERVAL: 344 MS
QTC INTERVAL: 450 MS
T WAVE AXIS: 67 DEGREES
VENTRICULAR RATE: 103 BPM

## 2021-03-29 PROCEDURE — 93010 ELECTROCARDIOGRAM REPORT: CPT | Performed by: INTERNAL MEDICINE

## 2021-04-21 ENCOUNTER — TELEPHONE (OUTPATIENT)
Dept: CARDIOLOGY CLINIC | Facility: CLINIC | Age: 86
End: 2021-04-21

## 2021-04-21 DIAGNOSIS — R00.0 TACHYCARDIA: Primary | ICD-10-CM

## 2021-04-21 NOTE — TELEPHONE ENCOUNTER
Nilton Lazar from [facility] called with concerns pt HR averages from   Pt stated this is normal for her, but [facility] just confirming before discharging  Pt is post COVID and otherwise feeling good     Rosa# [phone]

## 2021-04-23 ENCOUNTER — TELEPHONE (OUTPATIENT)
Dept: NON INVASIVE DIAGNOSTICS | Facility: HOSPITAL | Age: 86
End: 2021-04-23

## 2021-04-27 ENCOUNTER — HOSPITAL ENCOUNTER (OUTPATIENT)
Dept: NON INVASIVE DIAGNOSTICS | Facility: CLINIC | Age: 86
Discharge: HOME | End: 2021-04-27
Payer: COMMERCIAL

## 2021-04-27 DIAGNOSIS — R00.0 TACHYCARDIA: ICD-10-CM

## 2021-04-27 PROCEDURE — 93225 XTRNL ECG REC<48 HRS REC: CPT

## 2021-04-27 PROCEDURE — 93226 XTRNL ECG REC<48 HR SCAN A/R: CPT

## 2021-05-04 PROCEDURE — 93227 XTRNL ECG REC<48 HR R&I: CPT | Performed by: INTERNAL MEDICINE

## 2021-05-06 ENCOUNTER — TELEPHONE (OUTPATIENT)
Dept: CARDIOLOGY CLINIC | Facility: CLINIC | Age: 86
End: 2021-05-06

## 2021-05-06 NOTE — TELEPHONE ENCOUNTER
I left a detailed message on patient's vm   I called and spoke to Jaswinder, the home care nurse, and let her know of results also

## 2021-05-06 NOTE — TELEPHONE ENCOUNTER
Similar to previous 1, atrial fibrillation, slightly on the fast side favor no medications or changes if she is feeling well

## 2021-05-12 NOTE — TELEPHONE ENCOUNTER
Care Transition Team Discharge Planning    Anticipated Discharge Disposition: d/c to LTAC    Action: Lsw received text from MD earlier.    Pt will have surgery then may be able to d/c to LTAC in several days. She may d/c this weekend or early next week.     Barriers to Discharge: post surgery recovery    Plan: Lsw will continue to follow, and assist w/ d/c planning.     P/C would like Holter results        Please advise

## 2021-05-20 ENCOUNTER — TRANSCRIBE ORDERS (OUTPATIENT)
Dept: ADMINISTRATIVE | Facility: HOSPITAL | Age: 86
End: 2021-05-20

## 2021-05-20 DIAGNOSIS — Z13.820 ENCOUNTER FOR SCREENING FOR OSTEOPOROSIS: Primary | ICD-10-CM

## 2021-09-09 ENCOUNTER — HOSPITAL ENCOUNTER (OUTPATIENT)
Dept: RADIOLOGY | Age: 86
Discharge: HOME | End: 2021-09-09
Payer: COMMERCIAL

## 2021-09-09 DIAGNOSIS — Z13.820 ENCOUNTER FOR SCREENING FOR OSTEOPOROSIS: ICD-10-CM

## 2021-09-09 PROCEDURE — 77080 DXA BONE DENSITY AXIAL: CPT

## 2022-03-03 ENCOUNTER — OFFICE VISIT (OUTPATIENT)
Dept: CARDIOLOGY CLINIC | Facility: CLINIC | Age: 87
End: 2022-03-03
Payer: COMMERCIAL

## 2022-03-03 VITALS
WEIGHT: 159 LBS | BODY MASS INDEX: 30.02 KG/M2 | SYSTOLIC BLOOD PRESSURE: 140 MMHG | HEIGHT: 61 IN | HEART RATE: 95 BPM | OXYGEN SATURATION: 96 % | DIASTOLIC BLOOD PRESSURE: 80 MMHG

## 2022-03-03 DIAGNOSIS — I48.21 PERMANENT ATRIAL FIBRILLATION (HCC): Primary | ICD-10-CM

## 2022-03-03 DIAGNOSIS — I10 PRIMARY HYPERTENSION: ICD-10-CM

## 2022-03-03 PROCEDURE — 93000 ELECTROCARDIOGRAM COMPLETE: CPT | Performed by: INTERNAL MEDICINE

## 2022-03-03 PROCEDURE — 99213 OFFICE O/P EST LOW 20 MIN: CPT | Performed by: INTERNAL MEDICINE

## 2022-03-03 RX ORDER — LEVOTHYROXINE SODIUM 0.03 MG/1
TABLET ORAL
COMMUNITY
Start: 2022-01-14

## 2022-03-03 NOTE — PROGRESS NOTES
Cardiology Follow Up    Ivory Choi  11/21/1926  [id number]  [facility] [address]  [phone]    1  Permanent atrial fibrillation ([address] )     2  Primary hypertension         Interval History:  Cardiology follow-up  Patient has been doing well from the cardiac point of view, denies any syncope or presyncope denies any focal neurological deficits or amaurosis fugax  No significant bleeding issues on full anticoagulation factor Xa inhibitor  She did suffer admission for COVID-19 infection a year ago  She did recover well  Patient Active Problem List   Diagnosis    Hypertension    Hyperlipidemia    Vertigo    Hypertensive urgency    Prediabetes    Sinus pressure    Permanent atrial fibrillation (HCC)    COVID-19    Diarrhea    Generalized weakness    Hyponatremia     Past Medical History:   Diagnosis Date    Hyperlipidemia     Hypertension      Social History     Socioeconomic History    Marital status:      Spouse name: Not on file    Number of children: Not on file    Years of education: Not on file    Highest education level: Not on file   Occupational History    Not on file   Tobacco Use    Smoking status: Former Smoker     Types: Cigarettes    Smokeless tobacco: Never Used    Tobacco comment: quit 50 years ago   Substance and Sexual Activity    Alcohol use: Yes     Comment: 1x a week    Drug use: No    Sexual activity: Not on file   Other Topics Concern    Not on file   Social History Narrative    Not on file     Social Determinants of Health     Financial Resource Strain: Not on file   Food Insecurity: Not on file   Transportation Needs: Not on file   Physical Activity: Not on file   Stress: Not on file   Social Connections: Not on file   Intimate Partner Violence: Not on file   Housing Stability: Not on file      History reviewed   No pertinent family history  Past Surgical History:   Procedure Laterality Date    BREAST CYST EXCISION Right 1984    benign    HYSTERECTOMY      age 44       Current Outpatient Medications:     ascorbic acid (VITAMIN C) 1000 MG tablet, Take 1 tablet (1,000 mg total) by mouth every 12 (twelve) hours for 10 doses, Disp: 10 tablet, Rfl: 0    Blood Pressure Monitoring (ADULT BLOOD PRESSURE CUFF LG) KIT, by Does not apply route once for 1 dose, Disp: 1 each, Rfl: 0    calcium carbonate (OS-ESTEBAN) 600 MG tablet, Take 600 mg by mouth daily, Disp: , Rfl:     carvedilol (COREG) 3 125 mg tablet, Take 3 tablets (9 375 mg total) by mouth 2 (two) times a day with meals Coreg 3 125mg BID in addition to Coreg 6 25mg BID, Disp: 180 tablet, Rfl: 0    cholecalciferol (VITAMIN D3) 1,000 units tablet, Take 1,000 Units by mouth daily, Disp: , Rfl:     levothyroxine 25 mcg tablet, , Disp: , Rfl:     loratadine (CLARITIN) 10 mg tablet, Daily, Disp: , Rfl:     Multiple Vitamins-Minerals (MULTIVITAMIN ADULT PO), Take 1 tablet by mouth daily, Disp: , Rfl:     Psyllium (METAMUCIL PO), Take by mouth daily, Disp: , Rfl:     simvastatin (ZOCOR) 20 mg tablet, 20 mg daily , Disp: , Rfl:     XARELTO 20 MG tablet, daily with breakfast , Disp: , Rfl:     aspirin 81 mg chewable tablet, Chew 81 mg daily (Patient not taking: Reported on 3/3/2022 ), Disp: , Rfl:     zinc sulfate (ZINCATE) 220 mg capsule, Take 1 capsule (220 mg total) by mouth daily for 5 doses (Patient not taking: Reported on 3/3/2022 ), Disp: 5 capsule, Rfl: 0  No Known Allergies    Labs:  No visits with results within 6 Month(s) from this visit     Latest known visit with results is:   Admission on 03/20/2021, Discharged on 03/22/2021   Component Date Value    WBC 03/20/2021 5 39     RBC 03/20/2021 4 79     Hemoglobin 03/20/2021 14 2     Hematocrit 03/20/2021 42 8     MCV 03/20/2021 89     MCH 03/20/2021 29 6     MCHC 03/20/2021 33 2     RDW 03/20/2021 12 3     MPV 03/20/2021 9 9     Platelets 83/94/5712 159     nRBC 03/20/2021 0     Neutrophils Relative 03/20/2021 64     Immat GRANS % 03/20/2021 1     Lymphocytes Relative 03/20/2021 25     Monocytes Relative 03/20/2021 10     Eosinophils Relative 03/20/2021 0     Basophils Relative 03/20/2021 0     Neutrophils Absolute 03/20/2021 3 47     Immature Grans Absolute 03/20/2021 0 04     Lymphocytes Absolute 03/20/2021 1 34     Monocytes Absolute 03/20/2021 0 51     Eosinophils Absolute 03/20/2021 0 01     Basophils Absolute 03/20/2021 0 02     Sodium 03/20/2021 131*    Potassium 03/20/2021 4 5     Chloride 03/20/2021 95*    CO2 03/20/2021 28     ANION GAP 03/20/2021 8     BUN 03/20/2021 20     Creatinine 03/20/2021 1 39*    Glucose 03/20/2021 114     Calcium 03/20/2021 9 1     Corrected Calcium 03/20/2021 9 7     AST 03/20/2021 42     ALT 03/20/2021 34     Alkaline Phosphatase 03/20/2021 64     Total Protein 03/20/2021 8 0     Albumin 03/20/2021 3 3*    Total Bilirubin 03/20/2021 0 41     eGFR 03/20/2021 32     SARS-CoV-2 03/20/2021 Positive*    INFLUENZA A PCR 03/20/2021 Negative     INFLUENZA B PCR 03/20/2021 Negative     RSV PCR 03/20/2021 Negative     Troponin I 03/20/2021 <0 02     TSH 3RD GENERATON 03/20/2021 4 064*    Total CK 03/20/2021 140     NT-proBNP 03/20/2021 978*    CRP 03/20/2021 41 5*    Ferritin 03/20/2021 380     Osmolality Serum 03/20/2021 275*    Sodium, Ur 03/20/2021 34     Osmolality, Ur 03/20/2021 324     CK-MB Index 03/20/2021 <1 0     CK-MB 03/20/2021 1 3     WBC 03/21/2021 5 40     RBC 03/21/2021 4 61     Hemoglobin 03/21/2021 13 7     Hematocrit 03/21/2021 40 5     MCV 03/21/2021 88     MCH 03/21/2021 29 7     MCHC 03/21/2021 33 8     RDW 03/21/2021 12 3     MPV 03/21/2021 10 1     Platelets 02/44/6421 163     nRBC 03/21/2021 0     Neutrophils Relative 03/21/2021 57     Immat GRANS % 03/21/2021 1     Lymphocytes Relative 03/21/2021 32     Monocytes Relative 03/21/2021 10     Eosinophils Relative 03/21/2021 0     Basophils Relative 03/21/2021 0     Neutrophils Absolute 03/21/2021 3 08     Immature Grans Absolute 03/21/2021 0 03     Lymphocytes Absolute 03/21/2021 1 70     Monocytes Absolute 03/21/2021 0 56     Eosinophils Absolute 03/21/2021 0 01     Basophils Absolute 03/21/2021 0 02     Sodium 03/21/2021 131*    Potassium 03/21/2021 4 0     Chloride 03/21/2021 99*    CO2 03/21/2021 22     ANION GAP 03/21/2021 10     BUN 03/21/2021 19     Creatinine 03/21/2021 1 18     Glucose 03/21/2021 102     Calcium 03/21/2021 8 5     Corrected Calcium 03/21/2021 9 2     AST 03/21/2021 44     ALT 03/21/2021 28     Alkaline Phosphatase 03/21/2021 61     Total Protein 03/21/2021 7 5     Albumin 03/21/2021 3 1*    Total Bilirubin 03/21/2021 0 35     eGFR 03/21/2021 40     Procalcitonin 03/21/2021 <0 05     CRP 03/21/2021 44 9*    Ferritin 03/21/2021 407*    D-Dimer, Quant 03/21/2021 0 36     Procalcitonin 03/22/2021 <0 05     WBC 03/22/2021 4 76     RBC 03/22/2021 4 69     Hemoglobin 03/22/2021 13 9     Hematocrit 03/22/2021 41 7     MCV 03/22/2021 89     MCH 03/22/2021 29 6     MCHC 03/22/2021 33 3     RDW 03/22/2021 12 4     Platelets 57/58/1011 168     MPV 03/22/2021 10 1     Sodium 03/22/2021 134*    Potassium 03/22/2021 3 8     Chloride 03/22/2021 102     CO2 03/22/2021 23     ANION GAP 03/22/2021 9     BUN 03/22/2021 26*    Creatinine 03/22/2021 1 21     Glucose 03/22/2021 93     Calcium 03/22/2021 8 7     eGFR 03/22/2021 38     Ventricular Rate 03/20/2021 107     Atrial Rate 03/20/2021 375     QRSD Interval 03/20/2021 84     QT Interval 03/20/2021 310     QTC Interval 03/20/2021 413     QRS Axis 03/20/2021 18     T Wave Axis 03/20/2021 78     Ventricular Rate 03/21/2021 103     Atrial Rate 03/21/2021 98     QRSD Interval 03/21/2021 82     QT Interval 03/21/2021 344     QTC Interval 03/21/2021 450     QRS Axis 03/21/2021 -11     T Wave Axis 03/21/2021 67      Imaging: No results found  Review of Systems:  Review of Systems   Constitutional: Positive for fatigue  Negative for activity change  HENT: Negative for nosebleeds  Eyes: Negative for visual disturbance  Respiratory: Negative for apnea, shortness of breath, wheezing and stridor  Cardiovascular: Negative for chest pain, palpitations and leg swelling  Gastrointestinal: Negative for anal bleeding and blood in stool  Genitourinary: Negative for hematuria  Neurological: Negative for dizziness, syncope, speech difficulty and light-headedness  Hematological: Bruises/bleeds easily  Physical Exam:  Physical Exam  Vitals reviewed  Constitutional:       General: She is not in acute distress  Appearance: Normal appearance  She is not ill-appearing, toxic-appearing or diaphoretic  Comments: Appears younger than his stated age   Eyes:      General: No scleral icterus  Neck:      Vascular: No carotid bruit  Cardiovascular:      Rate and Rhythm: Normal rate  Rhythm irregular  Pulses: Normal pulses  Heart sounds: Normal heart sounds  No murmur heard  No friction rub  No gallop  Pulmonary:      Effort: Pulmonary effort is normal  No respiratory distress  Breath sounds: Normal breath sounds  No stridor  No wheezing, rhonchi or rales  Skin:     General: Skin is warm and dry  Capillary Refill: Capillary refill takes less than 2 seconds  Neurological:      Mental Status: She is alert  Psychiatric:         Mood and Affect: Mood normal          Discussion/Summary:  Chronic atrial fibrillation, on rate control plus anticoagulation, clinically stable, Holter monitor 2 years ago revealed control atrial fibrillation    An echocardiogram that time revealed normal left systolic function with mild left hypertrophy, there was mild-to-moderate mitral insufficiency and mild tricuspid insufficiency with estimated normal pulmonary pressures suggested by Doppler criteria  Holter monitor last year revealed atrial fibrillation with some intermittent episode of high ventricular rates but no sustained tachyarrhythmias  Favor no changes medications  Last creatinine 1 2 , GFR in the 40s  This note was completed in part utilizing [employer] direct voice recognition software  Grammatical errors, random word insertion, spelling mistakes, and incomplete sentences may be an occasional consequence of the system secondary to software limitations, ambient noise and hardware issues  At the time of dictation, efforts were made to edit, clarify and /or correct errors  Please read the chart carefully and recognize, using context, where substitutions have occurred  If you have any questions or concerns about the context, text or information contained within the body of this dictation, please contact myself, the provider, for further clarification

## 2022-09-20 ENCOUNTER — HOSPITAL ENCOUNTER (OUTPATIENT)
Dept: VASCULAR ULTRASOUND | Facility: HOSPITAL | Age: 87
Discharge: HOME | End: 2022-09-20
Payer: COMMERCIAL

## 2022-09-20 DIAGNOSIS — R42 DIZZINESS AND GIDDINESS: ICD-10-CM

## 2022-09-20 PROCEDURE — 93880 EXTRACRANIAL BILAT STUDY: CPT

## 2022-09-20 PROCEDURE — 93880 EXTRACRANIAL BILAT STUDY: CPT | Performed by: SURGERY

## 2023-03-08 ENCOUNTER — OFFICE VISIT (OUTPATIENT)
Dept: CARDIOLOGY CLINIC | Facility: CLINIC | Age: 88
End: 2023-03-08

## 2023-03-08 VITALS
OXYGEN SATURATION: 94 % | WEIGHT: 163 LBS | HEART RATE: 110 BPM | SYSTOLIC BLOOD PRESSURE: 160 MMHG | DIASTOLIC BLOOD PRESSURE: 90 MMHG | BODY MASS INDEX: 30.8 KG/M2

## 2023-03-08 DIAGNOSIS — E78.5 HYPERLIPIDEMIA, UNSPECIFIED HYPERLIPIDEMIA TYPE: ICD-10-CM

## 2023-03-08 DIAGNOSIS — I48.21 PERMANENT ATRIAL FIBRILLATION (HCC): ICD-10-CM

## 2023-03-08 DIAGNOSIS — I10 PRIMARY HYPERTENSION: Primary | ICD-10-CM

## 2023-03-08 NOTE — PROGRESS NOTES
Cardiology Follow Up    Yfn Jensen  11/21/1926  [id number]  [address] [place] [address],[place] [address] CATH LAB  [facility] [place] [address]  [address]  [phone]    1  Primary hypertension        2  Permanent atrial fibrillation (HCC)  POCT ECG      3  Hyperlipidemia, unspecified hyperlipidemia type            Interval History: Cardiology follow-up  Patient continues to do very well, she denies any chest pain or dyspnea, still very active for age, although ambulation has been decreased because of neuropathy  Denies any focal neurologic deficit murmurs decubitus  No bleeding issues on chronic anticoagulation with factor Xa inhibitor  Denies any syncope or presyncope  Patient Active Problem List   Diagnosis   • Hypertension   • Hyperlipidemia   • Vertigo   • Hypertensive urgency   • Prediabetes   • Sinus pressure   • Permanent atrial fibrillation (HCC)   • COVID-19   • Diarrhea   • Generalized weakness   • Hyponatremia     Past Medical History:   Diagnosis Date   • Hyperlipidemia    • Hypertension      Social History     Socioeconomic History   • Marital status:       Spouse name: Not on file   • Number of children: Not on file   • Years of education: Not on file   • Highest education level: Not on file   Occupational History   • Not on file   Tobacco Use   • Smoking status: Former     Types: Cigarettes   • Smokeless tobacco: Never   • Tobacco comments:     quit 50 years ago   Substance and Sexual Activity   • Alcohol use: Yes     Comment: 1x a week   • Drug use: No   • Sexual activity: Not on file   Other Topics Concern   • Not on file   Social History Narrative   • Not on file     Social Determinants of Health     Financial Resource Strain: Not on file   Food Insecurity: Not on file   Transportation Needs: Not on file   Physical Activity: Not on file   Stress: Not on file   Social Connections: Not on file   Intimate Partner Violence: Not on file Housing Stability: Not on file      History reviewed  No pertinent family history  Past Surgical History:   Procedure Laterality Date   • BREAST CYST EXCISION Right 1984    benign   • HYSTERECTOMY      age 44       Current Outpatient Medications:   •  ascorbic acid (VITAMIN C) 1000 MG tablet, Take 1 tablet (1,000 mg total) by mouth every 12 (twelve) hours for 10 doses, Disp: 10 tablet, Rfl: 0  •  calcium carbonate (OS-ESTEBAN) 600 MG tablet, Take 600 mg by mouth daily, Disp: , Rfl:   •  carvedilol (COREG) 3 125 mg tablet, Take 3 tablets (9 375 mg total) by mouth 2 (two) times a day with meals Coreg 3 125mg BID in addition to Coreg 6 25mg BID, Disp: 180 tablet, Rfl: 0  •  cholecalciferol (VITAMIN D3) 1,000 units tablet, Take 1,000 Units by mouth daily, Disp: , Rfl:   •  levothyroxine 25 mcg tablet, , Disp: , Rfl:   •  loratadine (CLARITIN) 10 mg tablet, Daily, Disp: , Rfl:   •  Multiple Vitamins-Minerals (MULTIVITAMIN ADULT PO), Take 1 tablet by mouth daily, Disp: , Rfl:   •  Psyllium (METAMUCIL PO), Take by mouth daily, Disp: , Rfl:   •  simvastatin (ZOCOR) 20 mg tablet, 20 mg daily , Disp: , Rfl:   •  XARELTO 20 MG tablet, daily with breakfast , Disp: , Rfl:   •  aspirin 81 mg chewable tablet, Chew 81 mg daily (Patient not taking: Reported on 3/8/2023), Disp: , Rfl:   •  Blood Pressure Monitoring (ADULT BLOOD PRESSURE CUFF LG) KIT, by Does not apply route once for 1 dose, Disp: 1 each, Rfl: 0  •  zinc sulfate (ZINCATE) 220 mg capsule, Take 1 capsule (220 mg total) by mouth daily for 5 doses (Patient not taking: Reported on 3/3/2022 ), Disp: 5 capsule, Rfl: 0  No Known Allergies    Labs:  No visits with results within 6 Month(s) from this visit     Latest known visit with results is:   Admission on 03/20/2021, Discharged on 03/22/2021   Component Date Value   • WBC 03/20/2021 5 39    • RBC 03/20/2021 4 79    • Hemoglobin 03/20/2021 14 2    • Hematocrit 03/20/2021 42 8    • MCV 03/20/2021 89    • MCH 03/20/2021 29 6 • MCHC 03/20/2021 33 2    • RDW 03/20/2021 12 3    • MPV 03/20/2021 9 9    • Platelets 41/65/4320 159    • nRBC 03/20/2021 0    • Neutrophils Relative 03/20/2021 64    • Immat GRANS % 03/20/2021 1    • Lymphocytes Relative 03/20/2021 25    • Monocytes Relative 03/20/2021 10    • Eosinophils Relative 03/20/2021 0    • Basophils Relative 03/20/2021 0    • Neutrophils Absolute 03/20/2021 3 47    • Immature Grans Absolute 03/20/2021 0 04    • Lymphocytes Absolute 03/20/2021 1 34    • Monocytes Absolute 03/20/2021 0 51    • Eosinophils Absolute 03/20/2021 0 01    • Basophils Absolute 03/20/2021 0 02    • Sodium 03/20/2021 131 (L)    • Potassium 03/20/2021 4 5    • Chloride 03/20/2021 95 (L)    • CO2 03/20/2021 28    • ANION GAP 03/20/2021 8    • BUN 03/20/2021 20    • Creatinine 03/20/2021 1 39 (H)    • Glucose 03/20/2021 114    • Calcium 03/20/2021 9 1    • Corrected Calcium 03/20/2021 9 7    • AST 03/20/2021 42    • ALT 03/20/2021 34    • Alkaline Phosphatase 03/20/2021 64    • Total Protein 03/20/2021 8 0    • Albumin 03/20/2021 3 3 (L)    • Total Bilirubin 03/20/2021 0 41    • eGFR 03/20/2021 32    • SARS-CoV-2 03/20/2021 Positive (A)    • INFLUENZA A PCR 03/20/2021 Negative    • INFLUENZA B PCR 03/20/2021 Negative    • RSV PCR 03/20/2021 Negative    • Troponin I 03/20/2021 <0 02    • TSH 3RD GENERATON 03/20/2021 4 064 (H)    • Total CK 03/20/2021 140    • NT-proBNP 03/20/2021 978 (H)    • CRP 03/20/2021 41 5 (H)    • Ferritin 03/20/2021 380    • Osmolality Serum 03/20/2021 275 (L)    • Sodium, Ur 03/20/2021 34    • Osmolality, Ur 03/20/2021 324    • CK-MB Index 03/20/2021 <1 0    • CK-MB 03/20/2021 1 3    • WBC 03/21/2021 5 40    • RBC 03/21/2021 4 61    • Hemoglobin 03/21/2021 13 7    • Hematocrit 03/21/2021 40 5    • MCV 03/21/2021 88    • MCH 03/21/2021 29 7    • MCHC 03/21/2021 33 8    • RDW 03/21/2021 12 3    • MPV 03/21/2021 10 1    • Platelets 48/45/0894 163    • nRBC 03/21/2021 0    • Neutrophils Relative 03/21/2021 57    • Immat GRANS % 03/21/2021 1    • Lymphocytes Relative 03/21/2021 32    • Monocytes Relative 03/21/2021 10    • Eosinophils Relative 03/21/2021 0    • Basophils Relative 03/21/2021 0    • Neutrophils Absolute 03/21/2021 3 08    • Immature Grans Absolute 03/21/2021 0 03    • Lymphocytes Absolute 03/21/2021 1 70    • Monocytes Absolute 03/21/2021 0 56    • Eosinophils Absolute 03/21/2021 0 01    • Basophils Absolute 03/21/2021 0 02    • Sodium 03/21/2021 131 (L)    • Potassium 03/21/2021 4 0    • Chloride 03/21/2021 99 (L)    • CO2 03/21/2021 22    • ANION GAP 03/21/2021 10    • BUN 03/21/2021 19    • Creatinine 03/21/2021 1 18    • Glucose 03/21/2021 102    • Calcium 03/21/2021 8 5    • Corrected Calcium 03/21/2021 9 2    • AST 03/21/2021 44    • ALT 03/21/2021 28    • Alkaline Phosphatase 03/21/2021 61    • Total Protein 03/21/2021 7 5    • Albumin 03/21/2021 3 1 (L)    • Total Bilirubin 03/21/2021 0 35    • eGFR 03/21/2021 40    • Procalcitonin 03/21/2021 <0 05    • CRP 03/21/2021 44 9 (H)    • Ferritin 03/21/2021 407 (H)    • D-Dimer, Quant 03/21/2021 0 36    • Procalcitonin 03/22/2021 <0 05    • WBC 03/22/2021 4 76    • RBC 03/22/2021 4 69    • Hemoglobin 03/22/2021 13 9    • Hematocrit 03/22/2021 41 7    • MCV 03/22/2021 89    • MCH 03/22/2021 29 6    • MCHC 03/22/2021 33 3    • RDW 03/22/2021 12 4    • Platelets 06/73/3958 168    • MPV 03/22/2021 10 1    • Sodium 03/22/2021 134 (L)    • Potassium 03/22/2021 3 8    • Chloride 03/22/2021 102    • CO2 03/22/2021 23    • ANION GAP 03/22/2021 9    • BUN 03/22/2021 26 (H)    • Creatinine 03/22/2021 1 21    • Glucose 03/22/2021 93    • Calcium 03/22/2021 8 7    • eGFR 03/22/2021 38    • Ventricular Rate 03/20/2021 107    • Atrial Rate 03/20/2021 375    • QRSD Interval 03/20/2021 84    • QT Interval 03/20/2021 310    • QTC Interval 03/20/2021 413    • QRS Axis 03/20/2021 18    • T Wave Axis 03/20/2021 78    • Ventricular Rate 03/21/2021 103    • Atrial Rate 03/21/2021 98    • QRSD Interval 03/21/2021 82    • QT Interval 03/21/2021 344    • QTC Interval 03/21/2021 450    • QRS Axis 03/21/2021 -11    • T Wave Axis 03/21/2021 67      Imaging: No results found  Review of Systems:  Review of Systems   Constitutional: Positive for fatigue  HENT: Positive for hearing loss  Negative for nosebleeds  Eyes: Negative for visual disturbance  Respiratory: Negative for apnea, shortness of breath, wheezing and stridor  Cardiovascular: Negative for chest pain, palpitations and leg swelling  Genitourinary: Negative for hematuria  Neurological: Negative for dizziness, tremors, syncope, facial asymmetry, speech difficulty and weakness  Hematological: Bruises/bleeds easily  Physical Exam:  Physical Exam  Vitals reviewed  Constitutional:       General: She is not in acute distress  Appearance: Normal appearance  She is not ill-appearing, toxic-appearing or diaphoretic  Neck:      Vascular: No carotid bruit  Cardiovascular:      Rate and Rhythm: Normal rate  Rhythm irregular  Heart sounds: Normal heart sounds  No murmur heard  No friction rub  No gallop  Pulmonary:      Effort: Pulmonary effort is normal  No respiratory distress  Breath sounds: Normal breath sounds  No stridor  No wheezing or rhonchi  Neurological:      Mental Status: She is alert  Psychiatric:         Mood and Affect: Mood normal          Discussion/Summary:   Chronic atrial fibrillation, on rate control plus anticoagulation, clinically stable, Holter monitor 2019 revealed control atrial fibrillation  An echocardiogram that time revealed normal left ventricular systolic function with mild left ventricular hypertrophy, there was mild-to-moderate mitral insufficiency and mild tricuspid insufficiency with estimated normal pulmonary pressures suggested by Doppler criteria    Holter monitor last 2021 revealed atrial fibrillation with some intermittent episode of high ventricular rates but no sustained tachyarrhythmias  Favor no changes medications  Last creatinine 1 2 , GFR in the 40s    This note was completed in part utilizing m-modal fluency direct voice recognition software  Grammatical errors, random word insertion, spelling mistakes, and incomplete sentences may be an occasional consequence of the system secondary to software limitations, ambient noise and hardware issues  At the time of dictation, efforts were made to edit, clarify and /or correct errors  Please read the chart carefully and recognize, using context, where substitutions have occurred  If you have any questions or concerns about the context, text or information contained within the body of this dictation, please contact myself, the provider, for further clarification

## 2023-03-21 ENCOUNTER — HOSPITAL ENCOUNTER (OUTPATIENT)
Dept: RADIOLOGY | Age: 88
Discharge: HOME | End: 2023-03-21

## 2023-03-21 DIAGNOSIS — E04.1 NONTOXIC SINGLE THYROID NODULE: ICD-10-CM

## 2023-05-16 ENCOUNTER — HOSPITAL ENCOUNTER (OUTPATIENT)
Dept: VASCULAR ULTRASOUND | Facility: HOSPITAL | Age: 88
Discharge: HOME | End: 2023-05-16

## 2023-05-16 DIAGNOSIS — I65.21 OCCLUSION OF RIGHT CAROTID ARTERY: ICD-10-CM

## 2023-06-13 LAB — HBA1C MFR BLD HPLC: 7.2 %

## 2023-06-14 NOTE — NURSING NOTE
Call placed to patient to discuss upcoming appointment at [facility] radiology department and complete consultation with patient  Patient is having a thyroid biopsy utilizing US  guidance  Reviewed patient's allergies, current anticoagulant medication of Xarelto present per patient but not required to stop per periprocedural management of coagulation status and hemostasis risk in percutaneous image guided procedure guidelines, also discussed the pre and post procedure expectations  Reminded patient of location and time expected for procedure, Patient expressed understanding by verbalizing and repeating instructions

## 2023-06-27 ENCOUNTER — HOSPITAL ENCOUNTER (OUTPATIENT)
Dept: RADIOLOGY | Facility: HOSPITAL | Age: 88
Discharge: HOME | End: 2023-06-27
Payer: COMMERCIAL

## 2023-06-27 DIAGNOSIS — E04.1 NONTOXIC SINGLE THYROID NODULE: ICD-10-CM

## 2023-06-27 PROCEDURE — 10005 FNA BX W/US GDN 1ST LES: CPT

## 2023-06-27 PROCEDURE — 88173 CYTOPATH EVAL FNA REPORT: CPT | Performed by: PATHOLOGY

## 2023-06-27 RX ORDER — LIDOCAINE HYDROCHLORIDE 10 MG/ML
2 INJECTION, SOLUTION EPIDURAL; INFILTRATION; INTRACAUDAL; PERINEURAL ONCE
Status: COMPLETED | OUTPATIENT
Start: 2023-06-27 | End: 2023-06-27

## 2023-06-27 RX ADMIN — LIDOCAINE HYDROCHLORIDE 2 ML: 10 INJECTION, SOLUTION EPIDURAL; INFILTRATION; INTRACAUDAL; PERINEURAL at 11:22

## 2023-06-29 PROCEDURE — 88173 CYTOPATH EVAL FNA REPORT: CPT | Performed by: PATHOLOGY

## 2023-09-20 ENCOUNTER — OFFICE VISIT (OUTPATIENT)
Dept: INTERNAL MEDICINE CLINIC | Facility: CLINIC | Age: 88
End: 2023-09-20
Payer: COMMERCIAL

## 2023-09-20 VITALS
OXYGEN SATURATION: 96 % | SYSTOLIC BLOOD PRESSURE: 124 MMHG | HEART RATE: 98 BPM | TEMPERATURE: 98 F | DIASTOLIC BLOOD PRESSURE: 82 MMHG

## 2023-09-20 DIAGNOSIS — E78.2 MIXED HYPERLIPIDEMIA: ICD-10-CM

## 2023-09-20 DIAGNOSIS — I48.21 PERMANENT ATRIAL FIBRILLATION (HCC): Primary | ICD-10-CM

## 2023-09-20 DIAGNOSIS — I73.9 RIGHT LEG CLAUDICATION (HCC): ICD-10-CM

## 2023-09-20 DIAGNOSIS — R73.03 PREDIABETES: ICD-10-CM

## 2023-09-20 DIAGNOSIS — N18.32 STAGE 3B CHRONIC KIDNEY DISEASE (HCC): ICD-10-CM

## 2023-09-20 DIAGNOSIS — I10 PRIMARY HYPERTENSION: ICD-10-CM

## 2023-09-20 DIAGNOSIS — I73.9 LEFT LEG CLAUDICATION (HCC): ICD-10-CM

## 2023-09-20 PROBLEM — I16.0 HYPERTENSIVE URGENCY: Status: RESOLVED | Noted: 2017-09-21 | Resolved: 2023-09-20

## 2023-09-20 PROCEDURE — 99204 OFFICE O/P NEW MOD 45 MIN: CPT | Performed by: INTERNAL MEDICINE

## 2023-09-20 RX ORDER — CARVEDILOL 12.5 MG/1
12.5 TABLET ORAL 2 TIMES DAILY WITH MEALS
COMMUNITY

## 2023-09-20 RX ORDER — ATORVASTATIN CALCIUM 20 MG/1
20 TABLET, FILM COATED ORAL
Qty: 90 TABLET | Refills: 1 | Status: SHIPPED | OUTPATIENT
Start: 2023-09-20

## 2023-09-20 RX ORDER — LATANOPROST 50 UG/ML
1 SOLUTION/ DROPS OPHTHALMIC
COMMUNITY
Start: 2023-06-08

## 2023-09-20 NOTE — PROGRESS NOTES
Name: Yudith Ayers      : 1926      MRN: [id number]  Encounter Provider: Jeannie Lynch DO  Encounter Date: 2023   Encounter department: [address] [place] [address]     1. Permanent atrial fibrillation (HCC)  Comments:  taking xarelto and BB and sees cardiology for ongoing care    2. Primary hypertension  Comments:  taking BB and BP doing well on home checks, c/w current rx/dose  Orders:  -     VAS AMY & waveform analysis, multiple levels; Future; Expected date: 2023    3. Stage 3b chronic kidney disease ([address])  Comments:  stable, c/w statin and BP control. t/c nephrology eval...will try to obtain BW results from 1-2 mos ago    4. Left leg claudication (HCC)  Comments:  AMY testing and c/w statin. will change to atorvastatin 20mg per day from zocor 20mg  Orders:  -     VAS AMY & waveform analysis, multiple levels; Future; Expected date: 2023    5. Right leg claudication (HCC)  Comments:  AMY testing and c/w statin. will change to atorvastatin 20mg per day from zocor 20mg  Orders:  -     VAS AMY & waveform analysis, multiple levels; Future; Expected date: 2023    6. Mixed hyperlipidemia  Comments:   will change to atorvastatin 20mg per day from zocor 20mg to see if this helps leg muscle aches  Orders:  -     VAS AMY & waveform analysis, multiple levels; Future; Expected date: 2023  -     atorvastatin (LIPITOR) 20 mg tablet; Take 1 tablet (20 mg total) by mouth daily at bedtime    7. Prediabetes  Comments:  stable for sometime per patient. will try to obtain BW from her most recent lab testing        Depression Screening and Follow-up Plan: Patient was screened for depression during today's encounter. They screened negative with a PHQ-2 score of 0. Subjective      HPI     New to practice, here to establish care. Reviewed her meds and past med hx with Hima Gallegos today.   She is taking her medications as prescribed and overall stable. She completed BW a couple of months ago with her previous PCP and hasn't rec'd the results yet([facility] lab). She is having b/l lower leg pain when she walks, get worse the more she walks. It improves with rest.  She has no knee pains. Her BP is doing well when she gets it checked at [facility]. She is concerned her statin is contributing to leg pains as well. ROS Otherwise negative, no other complaints. Review of Systems   Constitutional: Negative for fever. HENT: Negative for congestion. Eyes: Negative for visual disturbance. Respiratory: Negative for shortness of breath. Cardiovascular: Negative for chest pain. Gastrointestinal: Negative for abdominal pain. Endocrine: Negative for polyuria. Genitourinary: Negative for difficulty urinating. Musculoskeletal: Positive for myalgias. Skin: Negative for rash. Allergic/Immunologic: Negative for immunocompromised state. Neurological: Negative for dizziness. Hematological: Does not bruise/bleed easily. Psychiatric/Behavioral: Negative for dysphoric mood.        Current Outpatient Medications on File Prior to Visit   Medication Sig   • calcium carbonate (OS-ESTEBAN) 600 MG tablet Take 600 mg by mouth daily   • carvedilol (COREG) 12.5 mg tablet Take 12.5 mg by mouth 2 (two) times a day with meals   • cholecalciferol (VITAMIN D3) 1,000 units tablet Take 1,000 Units by mouth daily   • latanoprost (XALATAN) 0.005 % ophthalmic solution Administer 1 drop to both eyes daily at bedtime   • levothyroxine 25 mcg tablet    • loratadine (CLARITIN) 10 mg tablet Daily   • Multiple Vitamins-Minerals (MULTIVITAMIN ADULT PO) Take 1 tablet by mouth daily   • XARELTO 20 MG tablet daily with breakfast    • [DISCONTINUED] aspirin 81 mg chewable tablet Chew 81 mg daily   • [DISCONTINUED] carvedilol (COREG) 3.125 mg tablet Take 3 tablets (9.375 mg total) by mouth 2 (two) times a day with meals Coreg 3.125mg BID in addition to Coreg 6.25mg BID • [DISCONTINUED] Psyllium (METAMUCIL PO) Take by mouth daily   • [DISCONTINUED] simvastatin (ZOCOR) 20 mg tablet 20 mg daily    • Blood Pressure Monitoring (ADULT BLOOD PRESSURE CUFF LG) KIT by Does not apply route once for 1 dose   • [DISCONTINUED] ascorbic acid (VITAMIN C) 1000 MG tablet Take 1 tablet (1,000 mg total) by mouth every 12 (twelve) hours for 10 doses   • [DISCONTINUED] zinc sulfate (ZINCATE) 220 mg capsule Take 1 capsule (220 mg total) by mouth daily for 5 doses (Patient not taking: Reported on 3/3/2022 )       Objective     /82   Pulse 98   Temp 98 °F (36.7 °C)   SpO2 96%     Physical Exam  Vitals reviewed. Constitutional:       General: She is not in acute distress. HENT:      Head: Normocephalic and atraumatic. Right Ear: There is no impacted cerumen. Left Ear: There is no impacted cerumen. Eyes:      Conjunctiva/sclera: Conjunctivae normal.   Cardiovascular:      Rate and Rhythm: Normal rate. Rhythm irregular. Heart sounds:      No gallop. Pulmonary:      Effort: Pulmonary effort is normal.      Breath sounds: No wheezing or rales. Abdominal:      General: Bowel sounds are normal.      Palpations: Abdomen is soft. Tenderness: There is no abdominal tenderness. Musculoskeletal:      Cervical back: Neck supple. Right lower leg: No edema. Left lower leg: No edema. Neurological:      Mental Status: She is alert. Mental status is at baseline.       Gait: Gait normal.   Psychiatric:         Mood and Affect: Mood normal.         Behavior: Behavior normal.       Trell Myrick DO

## 2023-09-20 NOTE — Clinical Note
Please message care gap  Bonnie had AWV with her previous PCP in July, Dr Samra Espino  Results are in Epic or care everywhere

## 2023-09-29 ENCOUNTER — TELEPHONE (OUTPATIENT)
Dept: ADMINISTRATIVE | Facility: OTHER | Age: 88
End: 2023-09-29

## 2023-09-29 NOTE — TELEPHONE ENCOUNTER
----- Message from Juan J Covington sent at 9/28/2023  2:05 PM EDT -----  09/28/23 2:05 PM    Hello, our patient Yudith Ayers has had Medicare AWV completed/performed. Please assist in updating the patient chart by pulling the Care Everywhere (CE) document.  The date of service is 07/2023     Thank you,  Juan J Zayaspodonna Bell

## 2023-10-03 NOTE — TELEPHONE ENCOUNTER
Upon review of the In Basket request we were able to locate, review, and update the patient chart as requested for Medicare AW. Any additional questions or concerns should be emailed to the Practice Liaisons via the appropriate education email address, please do not reply via In Basket.     Thank you  Parr Spar

## 2023-10-04 DIAGNOSIS — E03.9 ACQUIRED HYPOTHYROIDISM: Primary | ICD-10-CM

## 2023-10-04 PROBLEM — U07.1 COVID-19: Status: RESOLVED | Noted: 2021-03-20 | Resolved: 2023-10-04

## 2023-10-04 PROBLEM — R19.7 DIARRHEA: Status: RESOLVED | Noted: 2021-03-20 | Resolved: 2023-10-04

## 2023-10-04 PROBLEM — R53.1 GENERALIZED WEAKNESS: Status: RESOLVED | Noted: 2021-03-20 | Resolved: 2023-10-04

## 2023-10-04 PROBLEM — J34.89 SINUS PRESSURE: Status: RESOLVED | Noted: 2017-09-21 | Resolved: 2023-10-04

## 2023-10-04 RX ORDER — LEVOTHYROXINE SODIUM 0.03 MG/1
25 TABLET ORAL DAILY
Qty: 90 TABLET | Refills: 1 | Status: SHIPPED | OUTPATIENT
Start: 2023-10-04

## 2023-10-04 NOTE — TELEPHONE ENCOUNTER
Please confirm with patient    She takes thyroid med: 25mcg once a day?     If yes, I will order to her mail order pharmacy    thx

## 2023-10-24 ENCOUNTER — HOSPITAL ENCOUNTER (OUTPATIENT)
Dept: NON INVASIVE DIAGNOSTICS | Facility: CLINIC | Age: 88
Discharge: HOME | End: 2023-10-24
Payer: COMMERCIAL

## 2023-10-24 DIAGNOSIS — E78.2 MIXED HYPERLIPIDEMIA: ICD-10-CM

## 2023-10-24 DIAGNOSIS — I73.9 LEFT LEG CLAUDICATION (HCC): ICD-10-CM

## 2023-10-24 DIAGNOSIS — I73.9 RIGHT LEG CLAUDICATION (HCC): ICD-10-CM

## 2023-10-24 DIAGNOSIS — I10 PRIMARY HYPERTENSION: ICD-10-CM

## 2023-10-24 PROCEDURE — 93923 UPR/LXTR ART STDY 3+ LVLS: CPT

## 2023-11-06 ENCOUNTER — RA CDI HCC (OUTPATIENT)
Dept: OTHER | Facility: HOSPITAL | Age: 88
End: 2023-11-06

## 2023-11-06 NOTE — PROGRESS NOTES
[address] coding opportunities       Chart reviewed, no opportunity found: [address] Review     Patients Insurance     Medicare Insurance: [employer]

## 2023-11-15 ENCOUNTER — OFFICE VISIT (OUTPATIENT)
Dept: INTERNAL MEDICINE CLINIC | Facility: CLINIC | Age: 88
End: 2023-11-15
Payer: COMMERCIAL

## 2023-11-15 ENCOUNTER — RA CDI HCC (OUTPATIENT)
Dept: OTHER | Facility: HOSPITAL | Age: 88
End: 2023-11-15

## 2023-11-15 ENCOUNTER — TELEPHONE (OUTPATIENT)
Dept: INTERNAL MEDICINE CLINIC | Facility: CLINIC | Age: 88
End: 2023-11-15

## 2023-11-15 VITALS
OXYGEN SATURATION: 98 % | DIASTOLIC BLOOD PRESSURE: 78 MMHG | HEART RATE: 95 BPM | SYSTOLIC BLOOD PRESSURE: 118 MMHG | TEMPERATURE: 98.5 F

## 2023-11-15 DIAGNOSIS — M54.50 CHRONIC BILATERAL LOW BACK PAIN WITHOUT SCIATICA: ICD-10-CM

## 2023-11-15 DIAGNOSIS — R73.09 ELEVATED HEMOGLOBIN A1C: Primary | ICD-10-CM

## 2023-11-15 DIAGNOSIS — E78.2 MIXED HYPERLIPIDEMIA: ICD-10-CM

## 2023-11-15 DIAGNOSIS — I48.21 PERMANENT ATRIAL FIBRILLATION (HCC): ICD-10-CM

## 2023-11-15 DIAGNOSIS — R73.03 PREDIABETES: ICD-10-CM

## 2023-11-15 DIAGNOSIS — I10 PRIMARY HYPERTENSION: Primary | ICD-10-CM

## 2023-11-15 DIAGNOSIS — N18.32 STAGE 3B CHRONIC KIDNEY DISEASE (HCC): ICD-10-CM

## 2023-11-15 DIAGNOSIS — G89.29 CHRONIC BILATERAL LOW BACK PAIN WITHOUT SCIATICA: ICD-10-CM

## 2023-11-15 DIAGNOSIS — E03.9 ACQUIRED HYPOTHYROIDISM: ICD-10-CM

## 2023-11-15 PROCEDURE — 99214 OFFICE O/P EST MOD 30 MIN: CPT | Performed by: INTERNAL MEDICINE

## 2023-11-15 NOTE — PATIENT INSTRUCTIONS
Back x-rays  Physical therapy  Xarelto 15mg per day, dosed for your kidneys  Blood work in December  Return in January 2024

## 2023-11-15 NOTE — TELEPHONE ENCOUNTER
I got Sole's BW from this summer, her previous PCP    It shows her blood sugar has gone up and she has diabetes    Blood sugar was 148 & kidney function is a bit worse than before    Recommend Sole meet with a diabetes educator to discuss how to change her diet to help this get better.   Ref entered, pls assist her in scheduling    She should get the BW I ordered in next couple of weeks, rather than waiting till December    If she would like a copy of these results, please mail to her(results are in to be scanned bin)    thanks

## 2023-11-15 NOTE — PROGRESS NOTES
Name: Breezy Galarza      : 1926      MRN: [id number]  Encounter Provider: Juan Alberto Ghosh DO  Encounter Date: 11/15/2023   Encounter department: [address] [place] [address]     1. Primary hypertension  Comments:  BP stable, c/w BB  Orders:  -     Comprehensive metabolic panel; Future; Expected date: 2023    2. Permanent atrial fibrillation (HCC)  Comments:  stable, c/w BB and reduce dose of xarelto to 15mg per day based on GFR on recent BW.  will get updated BW to re-assess  Orders:  -     rivaroxaban (Xarelto) 15 mg tablet; Take 1 tablet (15 mg total) by mouth daily with breakfast  -     CBC and differential; Future; Expected date: 2023    3. Acquired hypothyroidism  Comments:  taking TRH and due for TFTs, BW ordered  Orders:  -     TSH, 3rd generation with Free T4 reflex; Future; Expected date: 2023    4. Stage 3b chronic kidney disease ([address])  Comments:  noted on previous BW.  will try to obtain previous PCP's BW from summer 2023.  f/u BW ordered as well  Orders:  -     Comprehensive metabolic panel; Future; Expected date: 2023    5. Prediabetes  Comments:  c/w lower carb intake from diet and check A1C  Orders:  -     Hemoglobin A1C; Future; Expected date: 2023    6. Mixed hyperlipidemia  Comments:  taking atorvastatin and her legs feel a bit better, c/w this rx  Orders:  -     Comprehensive metabolic panel; Future; Expected date: 2023  -     Lipid Panel with Direct LDL reflex; Future; Expected date: 2023    7. Chronic bilateral low back pain without sciatica  Comments:  suspect spinal stenosis/OA. x-rays ordered and PT eval  Orders:  -     XR spine lumbar minimum 4 views non injury; Future; Expected date: 11/15/2023  -     Ambulatory Referral to Physical Therapy; Future        Depression Screening and Follow-up Plan: Patient was screened for depression during today's encounter.  They screened negative with a PHQ-2 score of 0. Subjective      HPI    Here for follow up, Laura Bledsoe is overall stable. Her leg pain has improved a bit with switching statins. Her AMY testing did not show PAD. She has had some low back pain over recent months, it is worse with the farther she walks and is relieved with sitting/resting. She has not had fall/injury and no radicular symptoms. She occ uses a cane to assist with ambulation. She defers COVID vaccine booster. She has CKD and takes xarelto 20mg per day for Afib. She has not had BW in sometime and never got BW results from her previous PCP done in summer 2023. ROS otherwise negative, no other complaints. Review of Systems   Constitutional:  Positive for fatigue. Negative for fever. HENT:  Negative for congestion. Eyes:  Negative for visual disturbance. Respiratory:  Negative for shortness of breath. Cardiovascular:  Negative for chest pain. Gastrointestinal:  Negative for abdominal pain. Endocrine: Negative for polyuria. Genitourinary:  Negative for difficulty urinating. Musculoskeletal:  Positive for back pain. Negative for gait problem. Skin:  Negative for rash. Allergic/Immunologic: Negative for immunocompromised state. Neurological:  Negative for dizziness. Hematological:  Does not bruise/bleed easily. Psychiatric/Behavioral:  Negative for dysphoric mood.         Current Outpatient Medications on File Prior to Visit   Medication Sig    atorvastatin (LIPITOR) 20 mg tablet Take 1 tablet (20 mg total) by mouth daily at bedtime    calcium carbonate (OS-ESTEBAN) 600 MG tablet Take 600 mg by mouth daily    carvedilol (COREG) 12.5 mg tablet Take 12.5 mg by mouth 2 (two) times a day with meals    cholecalciferol (VITAMIN D3) 1,000 units tablet Take 1,000 Units by mouth daily    latanoprost (XALATAN) 0.005 % ophthalmic solution Administer 1 drop to both eyes daily at bedtime    levothyroxine 25 mcg tablet Take 1 tablet (25 mcg total) by mouth daily loratadine (CLARITIN) 10 mg tablet Daily    Multiple Vitamins-Minerals (MULTIVITAMIN ADULT PO) Take 1 tablet by mouth daily    [DISCONTINUED] XARELTO 20 MG tablet daily with breakfast     Blood Pressure Monitoring (ADULT BLOOD PRESSURE CUFF LG) KIT by Does not apply route once for 1 dose       Objective     /78 (BP Location: Left arm, Patient Position: Sitting, Cuff Size: Standard)   Pulse 95   Temp 98.5 °F (36.9 °C)   SpO2 98%     Physical Exam  Vitals reviewed. Constitutional:       General: She is not in acute distress. HENT:      Head: Normocephalic and atraumatic. Right Ear: Tympanic membrane normal.      Left Ear: Tympanic membrane normal.   Eyes:      Conjunctiva/sclera: Conjunctivae normal.   Cardiovascular:      Rate and Rhythm: Normal rate. Rhythm irregular. Heart sounds: No murmur heard. Pulmonary:      Effort: Pulmonary effort is normal.      Breath sounds: No wheezing or rales. Abdominal:      General: Bowel sounds are normal.      Palpations: Abdomen is soft. Tenderness: There is no abdominal tenderness. Musculoskeletal:      Cervical back: Neck supple. Lumbar back: No tenderness or bony tenderness. Negative right straight leg raise test (neg seated SLR) and negative left straight leg raise test (neg seated SLR). Right lower leg: No edema. Left lower leg: No edema. Neurological:      Mental Status: She is alert. Mental status is at baseline.       Comments: Motor strength 5/5 lower extremity bilaterally   Psychiatric:         Mood and Affect: Mood normal.         Behavior: Behavior normal.       Trell Myrick DO

## 2023-11-15 NOTE — Clinical Note
Philip Mahan had BW in summer of this year with her previous PCP. She had it done thru [facility] lab but they never got the results to her former PCP or to her.   Can [facility] lab to fax us a copy of this?  thanks

## 2023-11-28 ENCOUNTER — APPOINTMENT (OUTPATIENT)
Dept: RADIOLOGY | Age: 88
End: 2023-11-28
Payer: COMMERCIAL

## 2023-11-28 DIAGNOSIS — G89.29 CHRONIC BILATERAL LOW BACK PAIN WITHOUT SCIATICA: ICD-10-CM

## 2023-11-28 DIAGNOSIS — M54.50 CHRONIC BILATERAL LOW BACK PAIN WITHOUT SCIATICA: ICD-10-CM

## 2023-11-28 PROCEDURE — 72110 X-RAY EXAM L-2 SPINE 4/>VWS: CPT

## 2023-11-29 ENCOUNTER — TELEPHONE (OUTPATIENT)
Dept: INTERNAL MEDICINE CLINIC | Facility: CLINIC | Age: 88
End: 2023-11-29

## 2023-11-29 NOTE — TELEPHONE ENCOUNTER
----- Message from Ajay Simpson DO sent at 11/29/2023  8:00 AM EST -----  X-rays are back, show multiple areas of degenerative changes/osteoarthritis and signs of scoliosis. The scoliosis has probably been present for many years. No fractures identified in low back.   Please c/w our plan of Physical therapy, thanks

## 2023-12-12 LAB — HBA1C MFR BLD HPLC: 6.8 %

## 2024-01-02 DIAGNOSIS — I48.21 PERMANENT ATRIAL FIBRILLATION (HCC): ICD-10-CM

## 2024-01-03 ENCOUNTER — OFFICE VISIT (OUTPATIENT)
Dept: INTERNAL MEDICINE CLINIC | Facility: CLINIC | Age: 89
End: 2024-01-03
Payer: COMMERCIAL

## 2024-01-03 ENCOUNTER — RA CDI HCC (OUTPATIENT)
Dept: OTHER | Facility: HOSPITAL | Age: 89
End: 2024-01-03

## 2024-01-03 VITALS
SYSTOLIC BLOOD PRESSURE: 118 MMHG | TEMPERATURE: 98 F | HEART RATE: 84 BPM | HEIGHT: 61 IN | BODY MASS INDEX: 30.8 KG/M2 | OXYGEN SATURATION: 98 % | DIASTOLIC BLOOD PRESSURE: 78 MMHG

## 2024-01-03 DIAGNOSIS — N18.32 STAGE 3B CHRONIC KIDNEY DISEASE (HCC): ICD-10-CM

## 2024-01-03 DIAGNOSIS — M79.671 BILATERAL FOOT PAIN: ICD-10-CM

## 2024-01-03 DIAGNOSIS — R73.03 PREDIABETES: ICD-10-CM

## 2024-01-03 DIAGNOSIS — E03.9 ACQUIRED HYPOTHYROIDISM: ICD-10-CM

## 2024-01-03 DIAGNOSIS — E78.2 MIXED HYPERLIPIDEMIA: ICD-10-CM

## 2024-01-03 DIAGNOSIS — I10 PRIMARY HYPERTENSION: ICD-10-CM

## 2024-01-03 DIAGNOSIS — I48.21 PERMANENT ATRIAL FIBRILLATION (HCC): Primary | ICD-10-CM

## 2024-01-03 DIAGNOSIS — M79.672 BILATERAL FOOT PAIN: ICD-10-CM

## 2024-01-03 PROCEDURE — 99214 OFFICE O/P EST MOD 30 MIN: CPT | Performed by: INTERNAL MEDICINE

## 2024-01-03 NOTE — PROGRESS NOTES
Name: Sole Peralta      : 1926      MRN: [id number]  Encounter Provider: Trell Myrick DO  Encounter Date: 1/3/2024   Encounter department: [facility] INTERNAL MEDICINE [facility]    Assessment & Plan     1. Permanent atrial fibrillation (HCC)  Comments:  taking xarelto and BB and seeing cardiology for ongoing care.  samples of xarelto provided to Sole today by [facility](2 weeks)  Orders:  -     CBC and differential; Future; Expected date: 2024    2. Primary hypertension  Comments:  taking BB and BP stable, c/w rx & checking bP @ home  Orders:  -     Comprehensive metabolic panel; Future; Expected date: 2024    3. Acquired hypothyroidism  Comments:  taking TRH and TFTs at goal, c/w current rx/dose  Orders:  -     TSH, 3rd generation with Free T4 reflex; Future; Expected date: 2024    4. Stage 3b chronic kidney disease (HCC)  Comments:  stable, c/w statin and good BP control  Orders:  -     Comprehensive metabolic panel; Future; Expected date: 2024  -     CBC and differential; Future; Expected date: 2024  -     Albumin / creatinine urine ratio; Future; Expected date: 2024    5. Prediabetes  Comments:  stable, c/w low carb diet.  will call lab to have them send us A1C(not rec'd)  Orders:  -     Hemoglobin A1C; Future; Expected date: 2024    6. Mixed hyperlipidemia  Comments:  taking atorvastatin and legs feel better, c/w rx  Orders:  -     Comprehensive metabolic panel; Future; Expected date: 2024  -     Lipid Panel with Direct LDL reflex; Future; Expected date: 2024    7. Bilateral foot pain  Comments:  suspect from OA, Sole was advised to obtain foot x-rays to confirm if she has OA/DJD contributing to foot pain, she deferred at this time.        Depression Screening and Follow-up Plan: Patient was screened for depression during today's encounter. They screened negative with a PHQ-2 score of 0.        Subjective       HPI    Here for follow up, Sole is overall doing well.  She is taking her medications as prescribed and completed BW prior to today's appt.  She is overall doing well, staying active.  She is feeling better since changing simvastatin to atorvastatin.  She had AMY testing which did not show PAD.  We reviewed her most recent BW results today except A1C which was not rec'd from the lab.  She has foot pain b/l with first walking and improves as she keeps moving.  No foot injury or swelling.  No pain at rest or at nighttime.  ROS otherwise negative, no other complaints.    Review of Systems   Constitutional:  Negative for fever.   HENT:  Negative for congestion.    Eyes:  Negative for visual disturbance.   Respiratory:  Negative for shortness of breath.    Cardiovascular:  Negative for chest pain.   Gastrointestinal:  Negative for abdominal pain.   Endocrine: Negative for polyuria.   Genitourinary:  Negative for difficulty urinating.   Musculoskeletal:  Negative for gait problem.   Skin:  Negative for rash.   Allergic/Immunologic: Positive for environmental allergies.   Neurological:  Negative for dizziness.   Psychiatric/Behavioral:  Negative for dysphoric mood.        Current Outpatient Medications on File Prior to Visit   Medication Sig    atorvastatin (LIPITOR) 20 mg tablet Take 1 tablet (20 mg total) by mouth daily at bedtime    calcium carbonate (OS-ESTEBAN) 600 MG tablet Take 600 mg by mouth daily    carvedilol (COREG) 12.5 mg tablet Take 12.5 mg by mouth 2 (two) times a day with meals    cholecalciferol (VITAMIN D3) 1,000 units tablet Take 1,000 Units by mouth daily    latanoprost (XALATAN) 0.005 % ophthalmic solution Administer 1 drop to both eyes daily at bedtime    levothyroxine 25 mcg tablet Take 1 tablet (25 mcg total) by mouth daily    loratadine (CLARITIN) 10 mg tablet Daily    Multiple Vitamins-Minerals (MULTIVITAMIN ADULT PO) Take 1 tablet by mouth daily    rivaroxaban (Xarelto) 15 mg tablet Take 1  "tablet (15 mg total) by mouth daily with breakfast    Blood Pressure Monitoring (ADULT BLOOD PRESSURE CUFF LG) KIT by Does not apply route once for 1 dose       Objective     /78 (BP Location: Left arm, Patient Position: Sitting, Cuff Size: Standard)   Pulse 84   Temp 98 °F (36.7 °C)   Ht 5' 1\" (1.549 m)   SpO2 98%   BMI 30.80 kg/m²     Physical Exam  Vitals reviewed.   Constitutional:       General: She is not in acute distress.  HENT:      Head: Normocephalic and atraumatic.      Right Ear: Tympanic membrane normal.      Left Ear: Tympanic membrane normal.   Eyes:      Conjunctiva/sclera: Conjunctivae normal.   Cardiovascular:      Rate and Rhythm: Normal rate and regular rhythm.      Heart sounds: No murmur heard.  Pulmonary:      Effort: Pulmonary effort is normal.      Breath sounds: No wheezing or rales.   Abdominal:      General: Bowel sounds are normal.      Palpations: Abdomen is soft.      Tenderness: There is no abdominal tenderness.   Musculoskeletal:      Cervical back: Neck supple.      Right lower leg: No edema.      Left lower leg: No edema.   Neurological:      Mental Status: She is alert. Mental status is at baseline.   Psychiatric:         Mood and Affect: Mood normal.         Behavior: Behavior normal.       Had BW at [facility] last month, reviewed with patient(A1C results not sent to the office for unclear reasons).    Trell Myrick DO    "

## 2024-01-03 NOTE — PROGRESS NOTES
HCC coding opportunities       Chart reviewed, no opportunity found: CHART REVIEWED, NO OPPORTUNITY FOUND        Patients Insurance     Medicare Insurance: [facility] [employer] Advantage

## 2024-01-09 ENCOUNTER — RA CDI HCC (OUTPATIENT)
Dept: OTHER | Facility: HOSPITAL | Age: 89
End: 2024-01-09

## 2024-01-15 DIAGNOSIS — I48.21 PERMANENT ATRIAL FIBRILLATION (HCC): ICD-10-CM

## 2024-01-15 RX ORDER — RIVAROXABAN 15 MG/1
15 TABLET, FILM COATED ORAL
Qty: 90 TABLET | Refills: 3 | Status: SHIPPED | OUTPATIENT
Start: 2024-01-15

## 2024-01-17 ENCOUNTER — RA CDI HCC (OUTPATIENT)
Dept: OTHER | Facility: HOSPITAL | Age: 89
End: 2024-01-17

## 2024-01-23 ENCOUNTER — TELEPHONE (OUTPATIENT)
Dept: INTERNAL MEDICINE CLINIC | Facility: CLINIC | Age: 89
End: 2024-01-23

## 2024-01-23 NOTE — TELEPHONE ENCOUNTER
----- Message from Trell Myrick DO sent at 1/23/2024  8:39 AM EST -----  I got Sole's A[facility]C back [facility].  It is 6.8% which is improved from her results from June last year.  Please continue working on consuming a low carb diet and trying to stay active.  Thank you

## 2024-03-12 ENCOUNTER — OFFICE VISIT (OUTPATIENT)
Dept: CARDIOLOGY CLINIC | Facility: CLINIC | Age: 89
End: 2024-03-12
Payer: COMMERCIAL

## 2024-03-12 VITALS
HEIGHT: 61 IN | WEIGHT: 159.1 LBS | SYSTOLIC BLOOD PRESSURE: 150 MMHG | DIASTOLIC BLOOD PRESSURE: 92 MMHG | BODY MASS INDEX: 30.04 KG/M2 | HEART RATE: 87 BPM

## 2024-03-12 DIAGNOSIS — I48.21 PERMANENT ATRIAL FIBRILLATION (HCC): Primary | ICD-10-CM

## 2024-03-12 DIAGNOSIS — E78.2 MIXED HYPERLIPIDEMIA: ICD-10-CM

## 2024-03-12 DIAGNOSIS — I10 PRIMARY HYPERTENSION: ICD-10-CM

## 2024-03-12 PROCEDURE — 99214 OFFICE O/P EST MOD 30 MIN: CPT | Performed by: INTERNAL MEDICINE

## 2024-03-12 PROCEDURE — 93000 ELECTROCARDIOGRAM COMPLETE: CPT | Performed by: INTERNAL MEDICINE

## 2024-03-12 NOTE — PROGRESS NOTES
Cardiology Follow Up    Sole Peralta  1926  [id number]  [facility] CARDIAC CATH LAB  [address] [address]  [phone]    1. Permanent atrial fibrillation (HCC)  POCT ECG      2. Primary hypertension        3. Mixed hyperlipidemia            Interval History: Cardiology follow-up.  Patient continues to do well from the cardiac no view, she is active, ambulation is limited by the her neuropathy.  She sustained no falls.  No bleeding issues on full anticoagulation with factor Xa inhibitor adjusted for age and renal function.  Compliant with low-sodium diet, blood pressures been well-controlled, today's is 150/92.  Compliant with low-cholesterol diet, she is on medium intensity statin therapy.  She had lipid profile done last year she was told it were adequate, I do not have the fractionation.  Denies any focal neurological deficit denies any emergency fugax.  No syncope or presyncope.    Patient Active Problem List   Diagnosis    Primary hypertension    Mixed hyperlipidemia    Vertigo    Prediabetes    Permanent atrial fibrillation (HCC)    Hyponatremia    Stage 3b chronic kidney disease (HCC)    Acquired hypothyroidism     Past Medical History:   Diagnosis Date    Hyperlipidemia     Hypertension      Social History     Socioeconomic History    Marital status:      Spouse name: Not on file    Number of children: Not on file    Years of education: Not on file    Highest education level: Not on file   Occupational History    Not on file   Tobacco Use    Smoking status: Former     Current packs/day: 0.00     Average packs/day: 1 pack/day for 20.0 years (20.0 ttl pk-yrs)     Types: Cigarettes     Start date: 1943     Quit date: 1963     Years since quittin.7     Passive exposure: Past    Smokeless tobacco: Never    Tobacco comments:     quit 50 years ago   Vaping Use    Vaping status: Not on file    Substance and Sexual Activity    Alcohol use: Yes     Comment: 1x a week    Drug use: No    Sexual activity: Not on file   Other Topics Concern    Not on file   Social History Narrative    Not on file     Social Determinants of Health     Financial Resource Strain: Not on file   Food Insecurity: Not on file   Transportation Needs: Not on file   Physical Activity: Not on file   Stress: Not on file   Social Connections: Not on file   Intimate Partner Violence: Not on file   Housing Stability: Not on file      No family history on file.  Past Surgical History:   Procedure Laterality Date    BREAST CYST EXCISION Right 1984    benign    HYSTERECTOMY      age 39, left 1 ovary per patient    US GUIDED THYROID BIOPSY  06/27/2023       Current Outpatient Medications:     atorvastatin (LIPITOR) 20 mg tablet, Take 1 tablet (20 mg total) by mouth daily at bedtime, Disp: 90 tablet, Rfl: 1    Blood Pressure Monitoring (ADULT BLOOD PRESSURE CUFF LG) KIT, by Does not apply route once for 1 dose, Disp: 1 each, Rfl: 0    calcium carbonate (OS-ESTEBAN) 600 MG tablet, Take 600 mg by mouth daily, Disp: , Rfl:     carvedilol (COREG) 12.5 mg tablet, Take 12.5 mg by mouth 2 (two) times a day with meals, Disp: , Rfl:     cholecalciferol (VITAMIN D3) 1,000 units tablet, Take 1,000 Units by mouth daily, Disp: , Rfl:     latanoprost (XALATAN) 0.005 % ophthalmic solution, Administer 1 drop to both eyes daily at bedtime, Disp: , Rfl:     levothyroxine 25 mcg tablet, Take 1 tablet (25 mcg total) by mouth daily, Disp: 90 tablet, Rfl: 1    loratadine (CLARITIN) 10 mg tablet, Daily, Disp: , Rfl:     Multiple Vitamins-Minerals (MULTIVITAMIN ADULT PO), Take 1 tablet by mouth daily, Disp: , Rfl:     rivaroxaban (Xarelto) 15 mg tablet, Take 1 tablet by mouth daily with breakfast, Disp: 90 tablet, Rfl: 3  No Known Allergies    Labs:  Orders Only on 12/12/2023   Component Date Value    Hemoglobin A1C 12/12/2023 6.8      Imaging: No results found.    Review  of Systems:  Review of Systems   Constitutional:  Positive for fatigue. Negative for activity change, diaphoresis and fever.   HENT:  Negative for hearing loss and nosebleeds.    Eyes:  Negative for visual disturbance.   Respiratory:  Negative for apnea, shortness of breath, wheezing and stridor.    Cardiovascular:  Negative for chest pain, palpitations and leg swelling.   Gastrointestinal:  Negative for abdominal pain, anal bleeding and blood in stool.   Endocrine: Negative for cold intolerance.   Genitourinary:  Positive for frequency. Negative for hematuria.   Musculoskeletal:  Positive for arthralgias and gait problem. Negative for myalgias.   Skin:  Negative for pallor and rash.   Allergic/Immunologic: Negative for immunocompromised state.   Neurological:  Positive for numbness. Negative for dizziness, tremors, syncope, facial asymmetry, speech difficulty, weakness and light-headedness.   Hematological:  Bruises/bleeds easily.   Psychiatric/Behavioral:  Negative for sleep disturbance. The patient is not nervous/anxious.        Physical Exam:  Physical Exam  Vitals reviewed.   Constitutional:       General: She is not in acute distress.     Appearance: Normal appearance. She is not ill-appearing, toxic-appearing or diaphoretic.      Comments: Appears younger than his stated age   Eyes:      General: No scleral icterus.  Neck:      Vascular: No carotid bruit.   Cardiovascular:      Rate and Rhythm: Normal rate. Rhythm irregular.      Pulses: Normal pulses.      Heart sounds: Normal heart sounds. No murmur heard.     No friction rub. No gallop.   Pulmonary:      Effort: Pulmonary effort is normal. No respiratory distress.      Breath sounds: Normal breath sounds. No stridor. No wheezing, rhonchi or rales.   Musculoskeletal:      Right lower leg: No edema.      Left lower leg: No edema.   Skin:     General: Skin is warm and dry.      Capillary Refill: Capillary refill takes less than 2 seconds.      Coloration:  Skin is not jaundiced or pale.      Findings: No bruising or erythema.   Neurological:      Mental Status: She is alert and oriented to person, place, and time.   Psychiatric:         Mood and Affect: Mood normal.         Discussion/Summary:  Chronic atrial fibrillation, on rate control plus anticoagulation, clinically stable, Holter monitor 2019 revealed control atrial fibrillation.  An echocardiogram that time revealed normal left ventricular ventricular systolic function with mild left ventricular hypertrophy, there was mild-to-moderate mitral insufficiency and mild tricuspid insufficiency with estimated normal pulmonary pressures suggested by Doppler criteria.  Holter monitor last 2021 revealed atrial fibrillation with some intermittent episode of high ventricular rates but no sustained tachyarrhythmias.  Favor no changes medications.  Continue full anticoagulation.  Coronary disease, duplex last year 20 2350 to 69% on the right and less than 50 on the left, unchanged from previous year.  She does have dyslipidemia on statin therapy.  Last creatinine 1.2., GFR in the 40s     This note was completed in part utilizing [employer] direct voice recognition software.   Grammatical errors, random word insertion, spelling mistakes, and incomplete sentences may be an occasional consequence of the system secondary to software limitations, ambient noise and hardware issues. At the time of dictation, efforts were made to edit, clarify and /or correct errors.  Please read the chart carefully and recognize, using context, where substitutions have occurred.  If you have any questions or concerns about the context, text or information contained within the body of this dictation, please contact myself, the provider, for further clarification.

## 2024-03-18 DIAGNOSIS — E03.9 ACQUIRED HYPOTHYROIDISM: ICD-10-CM

## 2024-03-18 DIAGNOSIS — E78.2 MIXED HYPERLIPIDEMIA: ICD-10-CM

## 2024-03-18 RX ORDER — LEVOTHYROXINE SODIUM 0.03 MG/1
25 TABLET ORAL DAILY
Qty: 90 TABLET | Refills: 1 | Status: SHIPPED | OUTPATIENT
Start: 2024-03-18

## 2024-03-18 RX ORDER — ATORVASTATIN CALCIUM 20 MG/1
20 TABLET, FILM COATED ORAL
Qty: 90 TABLET | Refills: 1 | Status: SHIPPED | OUTPATIENT
Start: 2024-03-18

## 2024-06-20 ENCOUNTER — TELEPHONE (OUTPATIENT)
Age: 89
End: 2024-06-20

## 2024-06-20 NOTE — TELEPHONE ENCOUNTER
Patient called inquiring about her lab orders. She will call back with fax number so we can send the orders to her.

## 2024-07-09 ENCOUNTER — RA CDI HCC (OUTPATIENT)
Dept: OTHER | Facility: HOSPITAL | Age: 89
End: 2024-07-09

## 2024-07-09 NOTE — PROGRESS NOTES
HCC coding opportunities       Chart reviewed, no opportunity found: CHART REVIEWED, NO OPPORTUNITY FOUND   [facility] reviewed     Patients Insurance     Medicare Insurance: [facility] [employer] Ad[employer]age

## 2024-07-12 ENCOUNTER — RA CDI HCC (OUTPATIENT)
Dept: OTHER | Facility: HOSPITAL | Age: 89
End: 2024-07-12

## 2024-07-17 ENCOUNTER — OFFICE VISIT (OUTPATIENT)
Dept: INTERNAL MEDICINE CLINIC | Facility: CLINIC | Age: 89
End: 2024-07-17

## 2024-07-17 VITALS
OXYGEN SATURATION: 98 % | DIASTOLIC BLOOD PRESSURE: 84 MMHG | TEMPERATURE: 98 F | SYSTOLIC BLOOD PRESSURE: 126 MMHG | HEART RATE: 86 BPM

## 2024-07-17 DIAGNOSIS — N18.32 STAGE 3B CHRONIC KIDNEY DISEASE (HCC): ICD-10-CM

## 2024-07-17 DIAGNOSIS — Z00.00 MEDICARE ANNUAL WELLNESS VISIT, SUBSEQUENT: ICD-10-CM

## 2024-07-17 DIAGNOSIS — I48.21 PERMANENT ATRIAL FIBRILLATION (HCC): ICD-10-CM

## 2024-07-17 DIAGNOSIS — E78.2 MIXED HYPERLIPIDEMIA: ICD-10-CM

## 2024-07-17 DIAGNOSIS — I10 PRIMARY HYPERTENSION: Primary | ICD-10-CM

## 2024-07-17 DIAGNOSIS — E55.9 VITAMIN D DEFICIENCY: ICD-10-CM

## 2024-07-17 DIAGNOSIS — R35.1 NOCTURIA: ICD-10-CM

## 2024-07-17 DIAGNOSIS — R73.03 PREDIABETES: ICD-10-CM

## 2024-07-17 DIAGNOSIS — E03.9 ACQUIRED HYPOTHYROIDISM: ICD-10-CM

## 2024-07-17 NOTE — PROGRESS NOTES
Sole is here for her Subsequent Wellness visit.     Health Risk Assessment:   Patient rates overall health as good. Patient feels that their physical health rating is same. Patient is very satisfied with their life. Eyesight was rated as slightly worse. Hearing was rated as same. Patient feels that their emotional and mental health rating is same. Patients states they are never, rarely angry. Patient states they are sometimes unusually tired/fatigued. Pain experienced in the last 7 days has been some. Patient's pain rating has been 5/10. Patient states that she has experienced no weight loss or gain in last 6 months.     Depression Screening:   PHQ-2 Score: 0      Fall Risk Screening:   In the past year, patient has experienced: no history of falling in past year      Urinary Incontinence Screening:   Patient has leaked urine accidently in the last six months.     Home Safety:  Patient has working smoke alarms and has working carbon monoxide detector. Home safety hazards include: none.     Nutrition:   Current diet is Regular.     Medications:   Patient is currently taking over-the-counter supplements. OTC medications include: see medication list. Patient is able to manage medications.     Activities of Daily Living (ADLs)/Instrumental Activities of Daily Living (IADLs):   Walk and transfer into and out of bed and chair?: Yes  Dress and groom yourself?: Yes    Bathe or shower yourself?: Yes    Feed yourself? Yes  Do your laundry/housekeeping?: Yes  Manage your money, pay your bills and track your expenses?: Yes  Make your own meals?: Yes    Do your own shopping?: Yes    Previous Hospitalizations:   Any hospitalizations or ED visits within the last 12 months?: No      Advance Care Planning:   Living will: Yes    Durable POA for healthcare: Yes    Advanced directive: Yes      PREVENTIVE SCREENINGS      Cardiovascular Screening:    General: Screening Not Indicated and History Lipid Disorder      Diabetes Screening:      General: Screening Current      Colorectal Cancer Screening:     General: Screening Not Indicated      Breast Cancer Screening:     General: Screening Not Indicated      Cervical Cancer Screening:    General: Screening Not Indicated      Osteoporosis Screening:    General: Screening Current      Abdominal Aortic Aneurysm (AAA) Screening:        General: Screening Not Indicated      Lung Cancer Screening:     General: Screening Not Indicated      Hepatitis C Screening:    General: Screening Not Indicated    Screening, Brief Intervention, and Referral to Treatment (SBIRT)    Screening  Typical number of drinks in a day: 0  Typical number of drinks in a week: 0  Interpretation: Low risk drinking behavior.    AUDIT-C Screenin) How often did you have a drink containing alcohol in the past year? never  2) How many drinks did you have on a typical day when you were drinking in the past year? 0  3) How often did you have 6 or more drinks on one occasion in the past year? never    AUDIT-C Score: 0  Interpretation: Score 0-2 (female): Negative screen for alcohol misuse    Single Item Drug Screening:  How often have you used an illegal drug (including marijuana) or a prescription medication for non-medical reasons in the past year? never    Single Item Drug Screen Score: 0  Interpretation: Negative screen for possible drug use disorder    Other Counseling Topics:   Calcium and vitamin D intake.

## 2024-07-17 NOTE — PROGRESS NOTES
Ambulatory Visit  Name: Sole Peralta      : 1926      MRN: [id number]  Encounter Provider: Trell Myrick DO  Encounter Date: 2024   Encounter department: [facility] INTERNAL MEDICINE [facility]    Assessment & Plan   1. Primary hypertension  Comments:  BP doing well, c/w BB  Orders:  -     Comprehensive metabolic panel; Future; Expected date: 2024  -     CBC and differential; Future; Expected date: 2024  2. Permanent atrial fibrillation (HCC)  Comments:  taking BB and xarelto and stable, c/w rx and care per cardiology  Orders:  -     CBC and differential; Future; Expected date: 2024  3. Acquired hypothyroidism  Comments:  TFTs stable.  t/c small dose increase if sleepiness/tiredness does not improve with addressing nocturia  Orders:  -     TSH, 3rd generation with Free T4 reflex; Future; Expected date: 2024  4. Mixed hyperlipidemia  Comments:  taking statin and no SE, c/w rx  Orders:  -     Comprehensive metabolic panel; Future; Expected date: 2024  -     Lipid Panel with Direct LDL reflex; Future; Expected date: 2024  5. Prediabetes  Comments:  A1C has gone up, c/w lower carb intake and staying active.  pt prefers to avoid new medication for now  Orders:  -     Hemoglobin A1C; Future; Expected date: 2024  -     Albumin / creatinine urine ratio; Future; Expected date: 2024  6. Stage 3b chronic kidney disease (HCC)  Comments:  stable, c/w statin and good BP control  Orders:  -     Comprehensive metabolic panel; Future; Expected date: 2024  -     CBC and differential; Future; Expected date: 2024  7. Vitamin D deficiency  Comments:  taking vit D OTC, c/w rx  Orders:  -     Vitamin D 25 hydroxy; Future; Expected date: 2024  8. Nocturia  Comments:  check urinalysis.  Pelvic PT recommended as this may be affecting patient's sleep  Orders:  -     UA w Reflex to Microscopic w Reflex to Culture; Future  -     Ambulatory  Referral to Physical Therapy; Future      Depression Screening and Follow-up Plan: Patient was screened for depression during today's encounter. They screened negative with a PHQ-2 score of 0.    Urinary Incontinence Plan of Care: Referral was placed for Physical therapy.       History of Present Illness     HPI    Here for follow up, Sole is overall stable.  She completed BW prior to today's appt and is taking her medications as prescribed.  She does not want to take a MVI any longer.  She has no AE from xarelto.  We reviewed her most recent BW results today.  She c/o feeling tired, sleepy during the day.  She sleeps for 8-9 hours each night but does get up to void a few times each night.  No UTI symptoms.  She takes a nap daily.  She does not take NSAIDs.  She is using a homeopathic topical remedy with kumar's wort to treat her neuropathy symptoms and with relief.  ROS Otherwise negative, no other complaints.    Review of Systems   Constitutional:  Negative for fever.   HENT:  Negative for congestion.    Eyes:  Negative for visual disturbance.   Respiratory:  Negative for shortness of breath.    Cardiovascular:  Negative for chest pain.   Gastrointestinal:  Negative for abdominal pain.   Endocrine: Negative for polyuria.   Genitourinary:  Negative for difficulty urinating, dysuria and urgency.   Musculoskeletal:  Negative for gait problem.   Skin:  Negative for rash.   Allergic/Immunologic: Negative for immunocompromised state.   Neurological:  Negative for dizziness.   Psychiatric/Behavioral:  Negative for dysphoric mood.        Objective     /84 (BP Location: Left arm, Patient Position: Sitting, Cuff Size: Standard)   Pulse 86   Temp 98 °F (36.7 °C)   SpO2 98%     Physical Exam  Vitals reviewed.   Constitutional:       General: She is not in acute distress.     Appearance: Normal appearance.   HENT:      Head: Normocephalic and atraumatic.      Mouth/Throat:      Mouth: Mucous membranes are moist.    Eyes:      Conjunctiva/sclera: Conjunctivae normal.   Cardiovascular:      Rate and Rhythm: Normal rate and regular rhythm.      Heart sounds: No murmur heard.  Pulmonary:      Effort: Pulmonary effort is normal.      Breath sounds: No wheezing or rales.   Abdominal:      General: Abdomen is flat. Bowel sounds are normal.      Palpations: Abdomen is soft.      Tenderness: There is no abdominal tenderness.   Musculoskeletal:      Right lower leg: No edema.      Left lower leg: No edema.   Neurological:      Mental Status: She is alert. Mental status is at baseline.   Psychiatric:         Mood and Affect: Mood normal.         Behavior: Behavior normal.       Administrative Statements

## 2024-07-24 ENCOUNTER — TELEPHONE (OUTPATIENT)
Dept: INTERNAL MEDICINE CLINIC | Facility: CLINIC | Age: 89
End: 2024-07-24

## 2024-07-24 DIAGNOSIS — R35.1 NOCTURIA: ICD-10-CM

## 2024-07-24 DIAGNOSIS — N30.90 BLADDER INFECTION: Primary | ICD-10-CM

## 2024-07-24 RX ORDER — CEFUROXIME AXETIL 250 MG/1
250 TABLET ORAL EVERY 12 HOURS SCHEDULED
Qty: 14 TABLET | Refills: 0 | Status: SHIPPED | OUTPATIENT
Start: 2024-07-24 | End: 2024-07-31

## 2024-07-24 NOTE — TELEPHONE ENCOUNTER
Pt called back she would be good with the antibiotics and would like them to go to [facility] please

## 2024-07-24 NOTE — TELEPHONE ENCOUNTER
----- Message from Trell Myrick DO sent at 7/24/2024  3:41 PM EDT -----  Urine test shows signs of an infection.  Recommend antibiotic to treat such as ceftin(penicillin type antibiotic) for 7 days.  If Sole tolerates antibiotics in the past, let me know and which pharmacy to send the medication to.  thanks

## 2024-07-30 ENCOUNTER — TELEPHONE (OUTPATIENT)
Dept: INTERNAL MEDICINE CLINIC | Facility: CLINIC | Age: 89
End: 2024-07-30

## 2024-07-30 NOTE — TELEPHONE ENCOUNTER
----- Message from Trell Myrick DO sent at 7/30/2024  8:20 AM EDT -----  Please call [facility] lab    They haven't sent us the final urine culture & sensitivities results    Thanks  ----- Message -----  From: Interface, Transcription Incoming  Sent: 7/25/2024   2:44 PM EDT  To: Trell Myrick DO

## 2024-09-11 DIAGNOSIS — E03.9 ACQUIRED HYPOTHYROIDISM: ICD-10-CM

## 2024-09-11 DIAGNOSIS — E78.2 MIXED HYPERLIPIDEMIA: ICD-10-CM

## 2024-09-11 RX ORDER — LEVOTHYROXINE SODIUM 25 UG/1
25 TABLET ORAL DAILY
Qty: 90 TABLET | Refills: 1 | Status: SHIPPED | OUTPATIENT
Start: 2024-09-11

## 2024-09-11 RX ORDER — ATORVASTATIN CALCIUM 20 MG/1
20 TABLET, FILM COATED ORAL
Qty: 90 TABLET | Refills: 1 | Status: SHIPPED | OUTPATIENT
Start: 2024-09-11

## 2024-12-13 ENCOUNTER — RA CDI HCC (OUTPATIENT)
Dept: OTHER | Facility: HOSPITAL | Age: 89
End: 2024-12-13

## 2024-12-14 NOTE — PROGRESS NOTES
HCC coding opportunities          Chart Reviewed number of suggestions sent to Provider: 1  Recommend adding I12.9 - combination code - hypertensive renal disease        Patients Insurance     Medicare Insurance: [facility] [employer]age

## 2025-01-02 DIAGNOSIS — I48.21 PERMANENT ATRIAL FIBRILLATION (HCC): ICD-10-CM

## 2025-01-02 NOTE — TELEPHONE ENCOUNTER
Reason for call:   [x] Refill   [] Prior Auth  [] Other:     Office:   [x] PCP/Provider -   [] Specialty/Provider -     Medication: rivaroxaban (Xarelto) 15 mg Take 1 tablet by mouth daily with breakfast,       Pharmacy: [facility]     Does the patient have enough for 3 days?   [] Yes   [x] No - Send as HP to POD

## 2025-01-02 NOTE — TELEPHONE ENCOUNTER
Patient called requesting refill for rivaroxaban (Xarelto) 15 mg tablet. Patient made aware medication was refilled on 1/2/2025 for 90 tablets with 1 refills to [facility]. Patient instructed to contact the pharmacy to obtain refills of medication. Patient verbalized understanding.

## 2025-01-15 ENCOUNTER — OFFICE VISIT (OUTPATIENT)
Age: OVER 89
End: 2025-01-15
Payer: COMMERCIAL

## 2025-01-15 VITALS
TEMPERATURE: 97.8 F | OXYGEN SATURATION: 99 % | HEART RATE: 101 BPM | DIASTOLIC BLOOD PRESSURE: 90 MMHG | WEIGHT: 159.6 LBS | BODY MASS INDEX: 30.16 KG/M2 | SYSTOLIC BLOOD PRESSURE: 140 MMHG

## 2025-01-15 DIAGNOSIS — R42 VERTIGO: ICD-10-CM

## 2025-01-15 DIAGNOSIS — M1A.9XX0 CHRONIC GOUT WITHOUT TOPHUS, UNSPECIFIED CAUSE, UNSPECIFIED SITE: ICD-10-CM

## 2025-01-15 DIAGNOSIS — K21.9 GASTROESOPHAGEAL REFLUX DISEASE, UNSPECIFIED WHETHER ESOPHAGITIS PRESENT: ICD-10-CM

## 2025-01-15 DIAGNOSIS — E03.9 ACQUIRED HYPOTHYROIDISM: Primary | ICD-10-CM

## 2025-01-15 DIAGNOSIS — I48.21 PERMANENT ATRIAL FIBRILLATION (HCC): ICD-10-CM

## 2025-01-15 DIAGNOSIS — I10 PRIMARY HYPERTENSION: ICD-10-CM

## 2025-01-15 DIAGNOSIS — E11.69 TYPE 2 DIABETES MELLITUS WITH OTHER SPECIFIED COMPLICATION, WITHOUT LONG-TERM CURRENT USE OF INSULIN (HCC): ICD-10-CM

## 2025-01-15 DIAGNOSIS — E78.2 MIXED HYPERLIPIDEMIA: ICD-10-CM

## 2025-01-15 DIAGNOSIS — N18.32 STAGE 3B CHRONIC KIDNEY DISEASE (HCC): ICD-10-CM

## 2025-01-15 PROBLEM — I65.21 STENOSIS OF RIGHT CAROTID ARTERY: Status: ACTIVE | Noted: 2023-05-03

## 2025-01-15 PROBLEM — R73.03 PREDIABETES: Status: RESOLVED | Noted: 2017-09-21 | Resolved: 2025-01-15

## 2025-01-15 PROBLEM — M10.9 GOUT: Status: ACTIVE | Noted: 2023-07-06

## 2025-01-15 PROBLEM — E11.9 TYPE 2 DIABETES MELLITUS (HCC): Status: ACTIVE | Noted: 2022-01-10

## 2025-01-15 PROBLEM — E87.1 HYPONATREMIA: Status: RESOLVED | Noted: 2021-03-20 | Resolved: 2025-01-15

## 2025-01-15 PROCEDURE — 99204 OFFICE O/P NEW MOD 45 MIN: CPT | Performed by: FAMILY MEDICINE

## 2025-01-15 NOTE — ASSESSMENT & PLAN NOTE
Not currently on any hypoglycemic medications   Hemoglobin A1c 7.41 (11/26/24), acceptable given advanced age, comorbidities, assisted living  Continue carb control diet and exercise daily

## 2025-01-15 NOTE — ASSESSMENT & PLAN NOTE
Hx of vertigo improving with PT in the past. However, her insurance doesn't approve [facility]. We encouraged her to call  to inquire which PT in their net-work.   Needs PT to maintain balance and fall prevention.

## 2025-01-15 NOTE — ASSESSMENT & PLAN NOTE
Stable, TSH WNL 4.1 (11/26/24)  Continue current dose of levothyroxine 25 mcg daily  Monitor TSH. Order given today due to tachycardia and somnolence.

## 2025-01-15 NOTE — PROGRESS NOTES
[facility]  [address], [address]  [place] [address]    [facility]  [facility]    NAME: Sole Peralta  AGE: 89 y.o. SEX: female    DATE OF ENCOUNTER: 1/15/2025    Assessment and Plan     Permanent atrial fibrillation (HCC)  Irregular  in the office today  Continue Coreg 12.5 mg twice daily.  Anticoagulation with Xarelto 15 mg daily  Encouraged to f/u with cardiology.   Will repeat TSH. Order placed today.    Primary hypertension  Currently on Coreg 12.5 mg daily in setting A-fib  BP log at home reviewed, in the low range 110s-130s/60s with one occasion 96/61, asymptomatic  Continue Coreg 12.5 mg daily  Continue monitoring BP.  Avoid hypotension    Acquired hypothyroidism  Stable, TSH WNL 4.1 (11/26/24)  Continue current dose of levothyroxine 25 mcg daily  Monitor TSH. Order given today due to tachycardia and somnolence.     Type 2 diabetes mellitus (HCC)  Not currently on any hypoglycemic medications   Hemoglobin A1c 7.41 (11/26/24), acceptable given advanced age, comorbidities, assisted living  Continue carb control diet and exercise daily    Stage 3b chronic kidney disease (HCC)  Stable, at baseline.  Creatinine 1.44 on 11/26/24    Gastroesophageal reflux disease  She reports taking Tums on an as-needed basis.    Gout  Stable, no joint swelling or pain    Vertigo  Hx of vertigo improving with PT in the past. However, her insurance doesn't approve [facility]. We encouraged her to call  to inquire which PT in their net-work.   Needs PT to maintain balance and fall prevention.      Orders Placed This Encounter   Procedures    TSH, 3rd generation with Free T4 reflex     Patient Instructions   Continue physical activities every day. Please stay hydrated.   Get your thyroid hormone checked and we will discuss next follow-up visit.   ED precautions.    - Counseling Documentation: patient was counseled  regarding: instructions for management and patient and family education    Chief Complaint     Chief Complaint   Patient presents with    Follow-up     Sleepy all the time, falls asleep easily. Left ear is itchy.     History of Present Illness   The patient presents to the office today, establish care with new PCP. She c/o sleepiness during the day for the last few months. She goes to bed around 11pm, gets up around 10 am. An-hour nap around 3pm.   Regularly exercise with rito munguia.   Reports an episode of low BP 96/61 after lunch on 1/9/25 with no changes in vision, dizziness, CP, SOB, or palpitations.   Has 1-2  hard BM daily. She takes metamucil daily.  The following portions of the patient's history were reviewed and updated as appropriate: allergies, current medications, past family history, past medical history, past social history, past surgical history and problem list.    Review of Systems   As per HPI, otherwise negative.    Objective     /90 (BP Location: Left arm, Patient Position: Sitting, Cuff Size: Adult)   Pulse 101   Temp 97.8 °F (36.6 °C) (Temporal)   Wt 72.4 kg (159 lb 9.6 oz)   SpO2 99%   BMI 30.16 kg/m²     Physical Exam  Vitals and nursing note reviewed.   General: no acute distress.  HENT:      Head: Normocephalic.      Nose: Nose normal.      Mouth: Mucous membranes are moist.      External ears normal, no rash noted.   Eyes:       Right eye: No discharge.         Left eye: No discharge.      Conjunctivae normal.   Cardiovascular:      Tachycardia 100 and irregular rhythm. No murmur heard.  Pulmonary:      Pulmonary effort is normal. No wheezing, rhonchi or rales.   Abdominal:      Bowel sounds are normal. There is no distension.      Abdomen is soft. There is no abdominal tenderness.   Musculoskeletal:      Right lower leg: No edema.      Left lower leg: No edema.   Skin:     General: Skin is warm.   Neurological: alert.      Oriented to person, place, and time. Cooperative,  follow commands      Behavior: normal.      Labs 11/26/24 reviewed    Creatinine 1.44  Normal sodium 136, potassium 4.5, calcium 9.5  AST 23, ALT 18  Hemoglobin A1c 7.4  TSH WNL 4.1  Vitamin D 45, sufficient  WBC 6.7, Hgb 13.6,   Current Medications     Current Outpatient Medications:     atorvastatin (LIPITOR) 20 mg tablet, Take 1 tablet (20 mg total) by mouth daily at bedtime, Disp: 90 tablet, Rfl: 1    calcium carbonate (OS-ESTEBAN) 600 MG tablet, Take 600 mg by mouth daily, Disp: , Rfl:     carvedilol (COREG) 12.5 mg tablet, Take 12.5 mg by mouth 2 (two) times a day with meals, Disp: , Rfl:     cholecalciferol (VITAMIN D3) 1,000 units tablet, Take 1,000 Units by mouth daily, Disp: , Rfl:     latanoprost (XALATAN) 0.005 % ophthalmic solution, Administer 1 drop to both eyes daily at bedtime, Disp: , Rfl:     levothyroxine 25 mcg tablet, Take 1 tablet by mouth daily, Disp: 90 tablet, Rfl: 1    loratadine (CLARITIN) 10 mg tablet, Daily, Disp: , Rfl:     rivaroxaban (Xarelto) 15 mg tablet, Take 1 tablet (15 mg total) by mouth daily with breakfast, Disp: 90 tablet, Rfl: 1    Blood Pressure Monitoring (ADULT BLOOD PRESSURE CUFF LG) KIT, by Does not apply route once for 1 dose, Disp: 1 each, Rfl: 0    Health Maintenance     Health Maintenance   Topic Date Due    Kidney Health Evaluation: Albumin/Creatinine Ratio  Never done    Diabetic Foot Exam  Never done    DM Eye Exam  Never done    BMI: Followup Plan  Never done    Zoster Vaccine (1 of 2) Never done    RSV Vaccine Age 60+ Years (1 - 1-dose 75+ series) Never done    Kidney Health Evaluation: GFR  03/22/2022    HEMOGLOBIN A1C  06/12/2024    Influenza Vaccine (1) 09/01/2024    COVID-19 Vaccine (4 - 2024-25 season) 09/01/2024    BMI: Adult  03/12/2025    Fall Risk  07/17/2025    Depression Screening  07/17/2025    Urinary Incontinence Screening  07/17/2025    Medicare Annual Wellness Visit (AWV)  07/17/2025    Pneumococcal Vaccine: 65+ Years  Completed     Meningococcal B Vaccine  Aged Out    RSV Vaccine age 0-20 Months  Aged Out    HIB Vaccine  Aged Out    IPV Vaccine  Aged Out    Hepatitis A Vaccine  Aged Out    Meningococcal ACWY Vaccine  Aged Out    HPV Vaccine  Aged Out     Immunization History   Administered Date(s) Administered    COVID-19 MODERNA VACC 0.5 ML IM 06/29/2021, 07/27/2021, 12/02/2021    INFLUENZA 10/10/2022, 10/09/2023    Influenza Split High Dose Preservative Free IM 10/15/2019    Influenza, high dose seasonal 0.7 mL 09/15/2020, 10/14/2021, 10/04/2023    Pneumococcal Conjugate 13-Valent 09/01/2016    Pneumococcal Polysaccharide PPV23 09/01/2017

## 2025-01-15 NOTE — ASSESSMENT & PLAN NOTE
Currently on Coreg 12.5 mg daily in setting A-fib  BP log at home reviewed, in the low range 110s-130s/60s with one occasion 96/61, asymptomatic  Continue Coreg 12.5 mg daily  Continue monitoring BP.  Avoid hypotension

## 2025-01-15 NOTE — PATIENT INSTRUCTIONS
Continue physical activities every day. Please stay hydrated.   Get your thyroid hormone checked and we will discuss next follow-up visit.

## 2025-01-15 NOTE — ASSESSMENT & PLAN NOTE
Irregular  in the office today  Continue Coreg 12.5 mg twice daily.  Anticoagulation with Xarelto 15 mg daily  Encouraged to f/u with cardiology.   Will repeat TSH. Order placed today.

## 2025-01-21 LAB — TSH SERPL-ACNC: 4.63 UIU/ML (ref 0.45–5.33)

## 2025-02-19 ENCOUNTER — OFFICE VISIT (OUTPATIENT)
Age: OVER 89
End: 2025-02-19
Payer: COMMERCIAL

## 2025-02-19 VITALS
TEMPERATURE: 97.7 F | DIASTOLIC BLOOD PRESSURE: 76 MMHG | HEART RATE: 97 BPM | SYSTOLIC BLOOD PRESSURE: 126 MMHG | BODY MASS INDEX: 30.16 KG/M2 | OXYGEN SATURATION: 99 % | HEIGHT: 61 IN

## 2025-02-19 DIAGNOSIS — I48.21 PERMANENT ATRIAL FIBRILLATION (HCC): Primary | ICD-10-CM

## 2025-02-19 DIAGNOSIS — E03.9 ACQUIRED HYPOTHYROIDISM: ICD-10-CM

## 2025-02-19 DIAGNOSIS — R42 VERTIGO: ICD-10-CM

## 2025-02-19 DIAGNOSIS — K21.9 GASTROESOPHAGEAL REFLUX DISEASE, UNSPECIFIED WHETHER ESOPHAGITIS PRESENT: ICD-10-CM

## 2025-02-19 DIAGNOSIS — E11.69 TYPE 2 DIABETES MELLITUS WITH OTHER SPECIFIED COMPLICATION, WITHOUT LONG-TERM CURRENT USE OF INSULIN (HCC): ICD-10-CM

## 2025-02-19 DIAGNOSIS — N18.32 STAGE 3B CHRONIC KIDNEY DISEASE (HCC): ICD-10-CM

## 2025-02-19 DIAGNOSIS — I10 PRIMARY HYPERTENSION: ICD-10-CM

## 2025-02-19 PROCEDURE — 99214 OFFICE O/P EST MOD 30 MIN: CPT | Performed by: FAMILY MEDICINE

## 2025-02-19 NOTE — PATIENT INSTRUCTIONS
Your blood pressure is good today. Continue same dose of medications.  Your thyroid hormone was in the normal range. We continue same dose of levothyroxine.  Please get your blood works done in May and I will see you in 3 months.  Call my office for any questions or concerns.

## 2025-03-10 NOTE — ASSESSMENT & PLAN NOTE
HR 97 in the office today  Continue Coreg 12.5 mg twice daily.  Anticoagulation with Xarelto 15 mg daily  f/u with cardiology as scheduled in June.   Repeat TSH was WNL 4.63 (1/21/2025).

## 2025-03-10 NOTE — ASSESSMENT & PLAN NOTE
Not currently on any medications   Hemoglobin A1c 7.41 (11/26/24), acceptable given advanced age, comorbidities, assisted living  Continue carb control diet and exercise daily.

## 2025-03-10 NOTE — ASSESSMENT & PLAN NOTE
Stable, TSH WNL 4.63 (1/21/25)  Continue current dose of levothyroxine 25 mcg daily  Monitor TSH.

## 2025-03-10 NOTE — ASSESSMENT & PLAN NOTE
Currently on Coreg 12.5 mg daily in setting A-fib  /76 in the office today  Continue Coreg 12.5 mg daily  Continue monitoring BP.  Avoid hypotension

## 2025-03-24 DIAGNOSIS — E03.9 ACQUIRED HYPOTHYROIDISM: ICD-10-CM

## 2025-03-24 NOTE — TELEPHONE ENCOUNTER
Reason for call:   [x] Refill   [] Prior Auth  [] Other:     Office:   [x] PCP/Provider -  Jocelyn Baldwin  [] Specialty/Provider -     Medication:  levothyroxine 25 mcg tablet [     Dose/Frequency:  Take 1 tablet by mouth daily     Quantity: 90    Pharmacy:   [facility] - [place], [place] - [address] [place] [address]  [address], [facility] [address]  Phone: [phone]  Fax: [phone]       Local Pharmacy   Does the patient have enough for 3 days?   [x] Yes   [] No - Send as HP to POD    Mail Away Pharmacy   Does the patient have enough for 10 days?   [] Yes   [] No - Send as HP to POD

## 2025-03-25 RX ORDER — LEVOTHYROXINE SODIUM 25 UG/1
25 TABLET ORAL DAILY
Qty: 90 TABLET | Refills: 1 | Status: SHIPPED | OUTPATIENT
Start: 2025-03-25

## 2025-04-03 DIAGNOSIS — E78.2 MIXED HYPERLIPIDEMIA: ICD-10-CM

## 2025-04-03 RX ORDER — ATORVASTATIN CALCIUM 20 MG/1
20 TABLET, FILM COATED ORAL
Qty: 90 TABLET | Refills: 1 | Status: SHIPPED | OUTPATIENT
Start: 2025-04-03

## 2025-05-21 ENCOUNTER — OFFICE VISIT (OUTPATIENT)
Age: OVER 89
End: 2025-05-21
Payer: COMMERCIAL

## 2025-05-21 VITALS
OXYGEN SATURATION: 97 % | WEIGHT: 157.2 LBS | BODY MASS INDEX: 29.68 KG/M2 | TEMPERATURE: 98.3 F | DIASTOLIC BLOOD PRESSURE: 74 MMHG | SYSTOLIC BLOOD PRESSURE: 128 MMHG | HEART RATE: 84 BPM | HEIGHT: 61 IN

## 2025-05-21 DIAGNOSIS — G89.29 CHRONIC BILATERAL LOW BACK PAIN WITHOUT SCIATICA: Primary | ICD-10-CM

## 2025-05-21 DIAGNOSIS — R42 VERTIGO: ICD-10-CM

## 2025-05-21 DIAGNOSIS — I48.21 PERMANENT ATRIAL FIBRILLATION (HCC): ICD-10-CM

## 2025-05-21 DIAGNOSIS — M1A.9XX0 CHRONIC GOUT WITHOUT TOPHUS, UNSPECIFIED CAUSE, UNSPECIFIED SITE: ICD-10-CM

## 2025-05-21 DIAGNOSIS — E78.2 MIXED HYPERLIPIDEMIA: ICD-10-CM

## 2025-05-21 DIAGNOSIS — M41.26 OTHER IDIOPATHIC SCOLIOSIS, LUMBAR REGION: ICD-10-CM

## 2025-05-21 DIAGNOSIS — M54.50 CHRONIC BILATERAL LOW BACK PAIN WITHOUT SCIATICA: Primary | ICD-10-CM

## 2025-05-21 DIAGNOSIS — E03.9 ACQUIRED HYPOTHYROIDISM: ICD-10-CM

## 2025-05-21 DIAGNOSIS — M47.816 SPONDYLOSIS OF LUMBAR REGION WITHOUT MYELOPATHY OR RADICULOPATHY: ICD-10-CM

## 2025-05-21 DIAGNOSIS — N18.32 STAGE 3B CHRONIC KIDNEY DISEASE (HCC): ICD-10-CM

## 2025-05-21 DIAGNOSIS — K21.9 GASTROESOPHAGEAL REFLUX DISEASE, UNSPECIFIED WHETHER ESOPHAGITIS PRESENT: ICD-10-CM

## 2025-05-21 DIAGNOSIS — E11.69 TYPE 2 DIABETES MELLITUS WITH OTHER SPECIFIED COMPLICATION, WITHOUT LONG-TERM CURRENT USE OF INSULIN (HCC): ICD-10-CM

## 2025-05-21 DIAGNOSIS — I10 PRIMARY HYPERTENSION: ICD-10-CM

## 2025-05-21 PROCEDURE — 99214 OFFICE O/P EST MOD 30 MIN: CPT | Performed by: FAMILY MEDICINE

## 2025-05-21 NOTE — ASSESSMENT & PLAN NOTE
Maintained on Tums on an as-needed basis. Reports taking once or twice a week.  Consider pantoprazole if worsening symptoms

## 2025-05-21 NOTE — PROGRESS NOTES
[facility]  [address], [address]  [place] [address]    [facility]LDER AD[facility]  [facility]    NAME: Sole Peralta  AGE: 89 y.o. SEX: female    DATE OF ENCOUNTER: 5/21/2025    Assessment and Plan     Acquired hypothyroidism  Stable, TSH WNL 4.63 (1/21/25)  Continue current dose of levothyroxine 25 mcg daily  Monitor TSH.     Permanent atrial fibrillation (HCC)  HR 97 in the office today  Continue Coreg 12.5 mg twice daily.  Anticoagulation with Xarelto 15 mg daily  f/u with cardiology as scheduled in June.   Repeat TSH was WNL 4.63 (1/21/2025).    Gastroesophageal reflux disease  Maintained on Tums on an as-needed basis. Reports taking once or twice a week.  Consider pantoprazole if worsening symptoms    Gout  Stable, no joint swelling or pain    Vertigo  Stable, no symptoms for years when she sleeps on her left side.    Primary hypertension  Continue Coreg 12.5 mg daily in setting A-fib  BP stable  Continue monitoring BP.  Avoid hypotension    Type 2 diabetes mellitus (HCC)  Hemoglobin A1c 7.41 (11/26/24), acceptable given advanced age, comorbidities, assisted living  Continue carb control diet and exercise daily.   Monitor hemoglobin A1c    Other idiopathic scoliosis, lumbar region  Encourage physical therapy    Spondylosis of lumbar region without myelopathy or radiculopathy  With intermittent back pain would benefit from exercise program  Discussed on walking, stretching daily      Stage 3b chronic kidney disease (HCC)  Stable, at baseline.  Creatinine 1.44 on 11/26/24  Monitor BMP.  Order placed  - Counseling Documentation: patient was counseled regarding: instructions for management and patient education.    History of Present Illness     The patient presents to the office today for f/u her chronic medical conditions.  She has no complaints today.  She denies headache, chest pain, or shortness of breath.  She takes medication as  "prescribed.    The following portions of the patient's history were reviewed and updated as appropriate: allergies, current medications, past family history, past medical history, past social history, past surgical history and problem list.      Objective     /74 (BP Location: Left arm, Patient Position: Sitting, Cuff Size: Standard)   Pulse 84   Temp 98.3 °F (36.8 °C) (Temporal)   Ht 5' 1\" (1.549 m)   Wt 71.3 kg (157 lb 3.2 oz)   SpO2 97%   BMI 29.70 kg/m²     Physical Exam  Vitals and nursing note reviewed.   General: no acute distress.  HENT:      Head: Normocephalic.      Nose: Nose normal.      Mouth: Mucous membranes are moist.   Eyes:       Right eye: No discharge.         Left eye: No discharge.      Conjunctivae normal.   Cardiovascular:      Normal rate and regular rhythm. No murmur heard.  Pulmonary:      Pulmonary effort is normal. No wheezing, rhonchi or rales.   Abdominal:      Bowel sounds are normal. There is no distension.      Abdomen is soft. There is no abdominal tenderness.   Musculoskeletal:      Right lower leg: No edema.      Left lower leg: No edema.   Skin:     General: Skin is warm.   Neurological: alert.      Oriented to person, place, and time. Cooperative, follow commands      Behavior: normal.      Labs & Imaging:    Current Medications     Current Outpatient Medications:     atorvastatin (LIPITOR) 20 mg tablet, Take 1 tablet (20 mg total) by mouth daily at bedtime, Disp: 90 tablet, Rfl: 1    calcium carbonate (OS-ESTEBAN) 600 MG tablet, Take 600 mg by mouth in the morning., Disp: , Rfl:     carvedilol (COREG) 12.5 mg tablet, Take 12.5 mg by mouth in the morning and 12.5 mg in the evening. Take with meals., Disp: , Rfl:     cholecalciferol (VITAMIN D3) 1,000 units tablet, Take 1,000 Units by mouth in the morning., Disp: , Rfl:     latanoprost (XALATAN) 0.005 % ophthalmic solution, Administer 1 drop to both eyes daily at bedtime, Disp: , Rfl:     levothyroxine 25 mcg tablet, " Take 1 tablet (25 mcg total) by mouth daily, Disp: 90 tablet, Rfl: 1    loratadine (CLARITIN) 10 mg tablet, in the morning., Disp: , Rfl:     rivaroxaban (Xarelto) 15 mg tablet, Take 1 tablet (15 mg total) by mouth daily with breakfast, Disp: 90 tablet, Rfl: 1    Blood Pressure Monitoring (ADULT BLOOD PRESSURE CUFF LG) KIT, by Does not apply route once for 1 dose, Disp: 1 each, Rfl: 0    Health Maintenance     Health Maintenance   Topic Date Due    Kidney Health Evaluation: Albumin/Creatinine Ratio  Never done    Diabetic Foot Exam  Never done    Diabetic Eye Exam  Never done    BMI: Followup Plan  Never done    Zoster Vaccine (1 of 2) Never done    RSV Vaccine for Pregnant Patients and Patients Age 60+ Years (1 - 1-dose 75+ series) Never done    Kidney Health Evaluation: GFR  03/22/2022    HEMOGLOBIN A1C  06/12/2024    COVID-19 Vaccine (4 - 2024-25 season) 09/01/2024    Fall Risk  07/17/2025    Depression Screening  07/17/2025    Medicare Annual Wellness Visit (AWV)  07/17/2025    Urinary Incontinence Screening  07/17/2025    Influenza Vaccine (Season Ended) 09/01/2025    BMI: Adult  05/21/2026    Pneumococcal Vaccine: 65+ Years  Completed    Meningococcal B Vaccine  Aged Out    RSV Vaccine age 0-20 Months  Aged Out    HIB Vaccine  Aged Out    IPV Vaccine  Aged Out    Hepatitis A Vaccine  Aged Out    Meningococcal ACWY Vaccine  Aged Out    HPV Vaccine  Aged Out     Immunization History   Administered Date(s) Administered    COVID-19 MODERNA VACC 0.5 ML IM 06/29/2021, 07/27/2021, 12/02/2021    INFLUENZA 10/10/2022, 10/09/2023    Influenza Split High Dose Preservative Free IM 10/15/2019    Influenza, high dose seasonal 0.7 mL 09/15/2020, 10/14/2021, 10/04/2023    Pneumococcal Conjugate 13-Valent 09/01/2016    Pneumococcal Polysaccharide PPV23 09/01/2017

## 2025-05-27 NOTE — ASSESSMENT & PLAN NOTE
Hemoglobin A1c 7.41 (11/26/24), acceptable given advanced age, comorbidities, assisted living  Continue carb control diet and exercise daily.   Monitor hemoglobin A1c

## 2025-05-27 NOTE — ASSESSMENT & PLAN NOTE
Continue Coreg 12.5 mg daily in setting A-fib  BP stable  Continue monitoring BP.  Avoid hypotension

## 2025-05-27 NOTE — ASSESSMENT & PLAN NOTE
With intermittent back pain would benefit from exercise program  Discussed on walking, stretching daily

## 2025-05-28 DIAGNOSIS — I10 ESSENTIAL (PRIMARY) HYPERTENSION: ICD-10-CM

## 2025-05-28 RX ORDER — CARVEDILOL 12.5 MG/1
12.5 TABLET ORAL 2 TIMES DAILY
Qty: 180 TABLET | Refills: 3 | Status: SHIPPED | OUTPATIENT
Start: 2025-05-28

## 2025-06-03 ENCOUNTER — OFFICE VISIT (OUTPATIENT)
Dept: CARDIOLOGY CLINIC | Facility: CLINIC | Age: OVER 89
End: 2025-06-03
Payer: COMMERCIAL

## 2025-06-03 VITALS
HEIGHT: 61 IN | BODY MASS INDEX: 29.64 KG/M2 | DIASTOLIC BLOOD PRESSURE: 82 MMHG | OXYGEN SATURATION: 98 % | HEART RATE: 94 BPM | WEIGHT: 157 LBS | SYSTOLIC BLOOD PRESSURE: 122 MMHG

## 2025-06-03 DIAGNOSIS — E78.2 MIXED HYPERLIPIDEMIA: ICD-10-CM

## 2025-06-03 DIAGNOSIS — I65.21 STENOSIS OF RIGHT CAROTID ARTERY: ICD-10-CM

## 2025-06-03 DIAGNOSIS — I10 PRIMARY HYPERTENSION: Primary | ICD-10-CM

## 2025-06-03 DIAGNOSIS — I48.21 PERMANENT ATRIAL FIBRILLATION (HCC): ICD-10-CM

## 2025-06-03 LAB
QRS AXIS: 0 DEGREES
QRSD INTERVAL: 84 MS
QT INTERVAL: 334 MS
QTC INTERVAL: 426 MS
T WAVE AXIS: 93 DEGREES
VENTRICULAR RATE: 98 BPM

## 2025-06-03 PROCEDURE — 99213 OFFICE O/P EST LOW 20 MIN: CPT | Performed by: INTERNAL MEDICINE

## 2025-06-03 PROCEDURE — 93000 ELECTROCARDIOGRAM COMPLETE: CPT | Performed by: INTERNAL MEDICINE

## 2025-06-03 NOTE — PROGRESS NOTES
Cardiology Follow Up    Sole Peralta  1926  [id number]  [facility]  [address] [address]  [phone]    1. Primary hypertension        2. Stenosis of right carotid artery        3. Permanent atrial fibrillation (HCC)        4. Mixed hyperlipidemia            Interval History: Cardiology follow-up.  Patient will be 89 years old this year.  He is overall doing very well for his age.  Ambulation is limited.  No bleeding issues on full anticoagulation adjusted for age and renal function.  No history of CVAs.  No syncope or presyncope.  Last creatinine 1.2 GFR in the 40s.  He has sustained no falls.  No recent lipid profile, he is on medium intense statin therapy.    Problem List[1]  Past Medical History[2]  Social History     Socioeconomic History    Marital status:      Spouse name: Not on file    Number of children: Not on file    Years of education: Not on file    Highest education level: Not on file   Occupational History    Not on file   Tobacco Use    Smoking status: Former     Current packs/day: 0.00     Average packs/day: 1 pack/day for 20.0 years (20.0 ttl pk-yrs)     Types: Cigarettes     Start date: 1943     Quit date: 1963     Years since quittin.9     Passive exposure: Past    Smokeless tobacco: Never    Tobacco comments:     quit 50 years ago   Vaping Use    Vaping status: Not on file   Substance and Sexual Activity    Alcohol use: Yes     Comment: 1x a week    Drug use: No    Sexual activity: Not on file   Other Topics Concern    Not on file   Social History Narrative    Not on file     Social Drivers of Health     Financial Resource Strain: Not on file   Food Insecurity: No Food Insecurity (2024)    Nursing - Inadequate Food Risk Classification     Worried About Running Out of Food in the Last Year: Never true     Ran Out of Food in the Last Year: Never true     Ran  Out of Food in the Last Year: Not on file   Transportation Needs: No Transportation Needs (7/17/2024)    PRAPARE - Transportation     Lack of Transportation (Medical): No     Lack of Transportation (Non-Medical): No   Physical Activity: Not on file   Stress: Not on file   Social Connections: Unknown (6/18/2024)    Received from [facility]     How often do you feel lonely or isolated from those around you? (Adult - for ages 18 years and over): Not on file   Intimate Partner Violence: Not on file   Housing Stability: Low Risk  (7/17/2024)    Housing Stability Vital Sign     Unable to Pay for Housing in the Last Year: No     Number of Times Moved in the Last Year: 1     Homeless in the Last Year: No      Family History[3]  Past Surgical History[4]  Current Medications[5]  No Known Allergies    Labs:  Orders Only on 01/21/2025   Component Date Value    TSH 01/21/2025 4.63      Imaging: No results found.    Review of Systems:  Review of Systems   HENT:  Negative for nosebleeds.    Respiratory:  Negative for apnea, shortness of breath and stridor.    Cardiovascular:  Negative for chest pain, palpitations and leg swelling.   Gastrointestinal:  Negative for anal bleeding and blood in stool.   Genitourinary:  Negative for hematuria.   Musculoskeletal:  Positive for arthralgias and gait problem.   Neurological:  Negative for syncope, speech difficulty and weakness.   Hematological:  Bruises/bleeds easily.       Physical Exam:  Physical Exam    Cardiovascular:      Rate and Rhythm: Normal rate. Rhythm irregular.      Heart sounds: Normal heart sounds. No murmur heard.     No friction rub. No gallop.   Pulmonary:      Effort: Pulmonary effort is normal. No respiratory distress.      Breath sounds: Normal breath sounds. No stridor. No wheezing, rhonchi or rales.     Psychiatric:         Mood and Affect: Mood normal.         Discussion/Summary: Chronic atrial fibrillation, on rate control plus  anticoagulation, clinically stable, Holter monitor 2019, revealed control atrial fibrillation.  An echocardiogram that time revealed normal left ventricular ventricular systolic function with mild left ventricular hypertrophy, there was mild-to-moderate mitral insufficiency and mild tricuspid insufficiency with estimated normal pulmonary pressures suggested by Doppler criteria.  Holter monitor last 2021, revealed atrial fibrillation with some intermittent episode of high ventricular rates but no sustained tachyarrhythmias.  Favor no changes medications.  Continue full anticoagulation.  Carotid disease, duplex 2023, 50 to 69% on the right and less than 50 on the left, unchanged from previous year.  She does have dyslipidemia on statin therapy.  Last creatinine 1.2., GFR in the 40s        This note was completed in part utilizing [employer] direct voice recognition software.   Grammatical errors, random word insertion, spelling mistakes, and incomplete sentences may be an occasional consequence of the system secondary to software limitations, ambient noise and hardware issues. At the time of dictation, efforts were made to edit, clarify and /or correct errors.  Please read the chart carefully and recognize, using context, where substitutions have occurred.  If you have any questions or concerns about the context, text or information contained within the body of this dictation, please contact myself, the provider, for further clarification.        [1]   Patient Active Problem List  Diagnosis    Primary hypertension    Mixed hyperlipidemia    Vertigo    Permanent atrial fibrillation (HCC)    Stage 3b chronic kidney disease (HCC)    Acquired hypothyroidism    Gastroesophageal reflux disease    Gout    Stenosis of right carotid artery    Type 2 diabetes mellitus (HCC)    Chronic bilateral low back pain without sciatica    Spondylosis of lumbar region without myelopathy or radiculopathy    Other idiopathic scoliosis,  lumbar region   [2]   Past Medical History:  Diagnosis Date    Hyperlipidemia     Hypertension    [3] No family history on file.  [4]   Past Surgical History:  Procedure Laterality Date    BREAST CYST EXCISION Right 1984    benign    HYSTERECTOMY      age 39, left 1 ovary per patient    US GUIDED THYROID BIOPSY  06/27/2023   [5]   Current Outpatient Medications:     atorvastatin (LIPITOR) 20 mg tablet, Take 1 tablet (20 mg total) by mouth daily at bedtime, Disp: 90 tablet, Rfl: 1    Blood Pressure Monitoring (ADULT BLOOD PRESSURE CUFF LG) KIT, by Does not apply route once for 1 dose, Disp: 1 each, Rfl: 0    calcium carbonate (OS-ESTEBAN) 600 MG tablet, Take 600 mg by mouth in the morning., Disp: , Rfl:     carvedilol (COREG) 12.5 mg tablet, Take 1 Tablet by mouth 2 times a day., Disp: 180 tablet, Rfl: 3    cholecalciferol (VITAMIN D3) 1,000 units tablet, Take 1,000 Units by mouth in the morning., Disp: , Rfl:     latanoprost (XALATAN) 0.005 % ophthalmic solution, Administer 1 drop to both eyes daily at bedtime, Disp: , Rfl:     levothyroxine 25 mcg tablet, Take 1 tablet (25 mcg total) by mouth daily, Disp: 90 tablet, Rfl: 1    loratadine (CLARITIN) 10 mg tablet, in the morning., Disp: , Rfl:     rivaroxaban (Xarelto) 15 mg tablet, Take 1 tablet (15 mg total) by mouth daily with breakfast, Disp: 90 tablet, Rfl: 1

## 2025-07-07 DIAGNOSIS — I48.21 PERMANENT ATRIAL FIBRILLATION (HCC): ICD-10-CM

## 2025-07-07 RX ORDER — RIVAROXABAN 15 MG/1
TABLET, FILM COATED ORAL
Qty: 90 TABLET | Refills: 1 | Status: SHIPPED | OUTPATIENT
Start: 2025-07-07